# Patient Record
Sex: FEMALE | Race: WHITE | NOT HISPANIC OR LATINO | Employment: UNEMPLOYED | URBAN - NONMETROPOLITAN AREA
[De-identification: names, ages, dates, MRNs, and addresses within clinical notes are randomized per-mention and may not be internally consistent; named-entity substitution may affect disease eponyms.]

---

## 2018-02-19 ENCOUNTER — HISTORICAL (OUTPATIENT)
Dept: ADMINISTRATIVE | Facility: HOSPITAL | Age: 48
End: 2018-02-19

## 2018-03-14 ENCOUNTER — HISTORICAL (OUTPATIENT)
Dept: ADMINISTRATIVE | Facility: HOSPITAL | Age: 48
End: 2018-03-14

## 2020-01-23 ENCOUNTER — HISTORICAL (OUTPATIENT)
Dept: ADMINISTRATIVE | Facility: HOSPITAL | Age: 50
End: 2020-01-23

## 2020-01-23 LAB
ALBUMIN SERPL-MCNC: 3.9 G/DL (ref 3.8–4.9)
ALBUMIN/GLOB SERPL: 1.8 {RATIO} (ref 1.2–2.2)
ALP SERPL-CCNC: 112 IU/L (ref 39–117)
ALT SERPL-CCNC: 20 IU/L (ref 0–32)
AST SERPL-CCNC: 16 IU/L (ref 0–40)
BASOPHILS # BLD AUTO: 0.1 X10E3/UL (ref 0–0.2)
BASOPHILS NFR BLD AUTO: 1 %
BILIRUB SERPL-MCNC: 0.3 MG/DL (ref 0–1.2)
BUN SERPL-MCNC: 11 MG/DL (ref 6–24)
CALCIUM SERPL-MCNC: 8.9 MG/DL (ref 8.7–10.2)
CHLORIDE SERPL-SCNC: 100 MMOL/L (ref 96–106)
CHOLEST SERPL-MCNC: 193 MG/DL (ref 100–199)
CHOLEST/HDLC SERPL: 5.2 RATIO (ref 0–4.4)
CO2 SERPL-SCNC: 20 MMOL/L (ref 20–29)
CREAT SERPL-MCNC: 0.91 MG/DL (ref 0.57–1)
CREAT/UREA NIT SERPL: 12 (ref 9–23)
EOSINOPHIL # BLD AUTO: 0.1 X10E3/UL (ref 0–0.4)
EOSINOPHIL NFR BLD AUTO: 2 %
ERYTHROCYTE [DISTWIDTH] IN BLOOD BY AUTOMATED COUNT: 12.5 % (ref 11.7–15.4)
GLOBULIN SER-MCNC: 2.2 G/DL (ref 1.5–4.5)
GLUCOSE SERPL-MCNC: 121 MG/DL (ref 65–99)
HBA1C MFR BLD: 5.7 % (ref 4.8–5.6)
HCT VFR BLD AUTO: 46 % (ref 34–46.6)
HDLC SERPL-MCNC: 37 MG/DL
HGB BLD-MCNC: 15.7 G/DL (ref 11.1–15.9)
LDLC SERPL CALC-MCNC: 123 MG/DL (ref 0–99)
LYMPHOCYTES # BLD AUTO: 1.9 X10E3/UL (ref 0.7–3.1)
LYMPHOCYTES NFR BLD AUTO: 25 %
MCH RBC QN AUTO: 29.7 PG (ref 26.6–33)
MCHC RBC AUTO-ENTMCNC: 34.1 G/DL (ref 31.5–35.7)
MCV RBC AUTO: 87 FL (ref 79–97)
MONOCYTES # BLD AUTO: 0.7 X10E3/UL (ref 0.1–0.9)
MONOCYTES NFR BLD AUTO: 9 %
NEUTROPHILS # BLD AUTO: 4.7 X10E3/UL (ref 1.4–7)
NEUTROPHILS NFR BLD AUTO: 63 %
PLATELET # BLD AUTO: 207 X10E3/UL (ref 150–450)
POTASSIUM SERPL-SCNC: 4.1 MMOL/L (ref 3.5–5.2)
PROT SERPL-MCNC: 6.1 G/DL (ref 6–8.5)
RBC # BLD AUTO: 5.28 X10(6)/MCL (ref 3.77–5.28)
SODIUM SERPL-SCNC: 136 MMOL/L (ref 134–144)
TRIGL SERPL-MCNC: 167 MG/DL (ref 0–149)
TSH SERPL-ACNC: 1.9 MIU/ML (ref 0.45–4.5)
VLDLC SERPL CALC-MCNC: 33 MG/DL (ref 5–40)
WBC # SPEC AUTO: 7.6 X10E3/UL (ref 3.4–10.8)

## 2020-04-27 ENCOUNTER — HISTORICAL (OUTPATIENT)
Dept: ADMINISTRATIVE | Facility: HOSPITAL | Age: 50
End: 2020-04-27

## 2020-04-27 LAB
ALBUMIN SERPL-MCNC: 4.4 G/DL (ref 3.8–4.9)
ALBUMIN/GLOB SERPL: 2.4 {RATIO} (ref 1.2–2.2)
ALP SERPL-CCNC: 97 IU/L (ref 39–117)
ALT SERPL-CCNC: 27 IU/L (ref 0–32)
AST SERPL-CCNC: 24 IU/L (ref 0–40)
BILIRUB SERPL-MCNC: 0.2 MG/DL (ref 0–1.2)
BUN SERPL-MCNC: 10 MG/DL (ref 6–24)
CALCIUM SERPL-MCNC: 9.2 MG/DL (ref 8.7–10.2)
CHLORIDE SERPL-SCNC: 100 MMOL/L (ref 96–106)
CHOLEST SERPL-MCNC: 138 MG/DL (ref 100–199)
CHOLEST/HDLC SERPL: 3.3 RATIO (ref 0–4.4)
CO2 SERPL-SCNC: 23 MMOL/L (ref 20–29)
CREAT SERPL-MCNC: 0.99 MG/DL (ref 0.57–1)
CREAT/UREA NIT SERPL: 10 (ref 9–23)
GLOBULIN SER-MCNC: 1.8 G/DL (ref 1.5–4.5)
GLUCOSE SERPL-MCNC: 103 MG/DL (ref 65–99)
HDLC SERPL-MCNC: 42 MG/DL
LDLC SERPL CALC-MCNC: 73 MG/DL (ref 0–99)
POTASSIUM SERPL-SCNC: 4.5 MMOL/L (ref 3.5–5.2)
PROT SERPL-MCNC: 6.2 G/DL (ref 6–8.5)
SODIUM SERPL-SCNC: 138 MMOL/L (ref 134–144)
TRIGL SERPL-MCNC: 113 MG/DL (ref 0–149)
VLDLC SERPL CALC-MCNC: 23 MG/DL (ref 5–40)

## 2021-04-05 ENCOUNTER — HISTORICAL (OUTPATIENT)
Dept: ADMINISTRATIVE | Facility: HOSPITAL | Age: 51
End: 2021-04-05

## 2021-04-05 LAB
ALBUMIN SERPL-MCNC: 4.2 G/DL (ref 3.8–4.9)
ALBUMIN/GLOB SERPL: 2.2 {RATIO} (ref 1.2–2.2)
ALP SERPL-CCNC: 99 IU/L (ref 39–117)
ALT SERPL-CCNC: 31 IU/L (ref 0–32)
AST SERPL-CCNC: 19 IU/L (ref 0–40)
BASOPHILS # BLD AUTO: 0.1 X10E3/UL (ref 0–0.2)
BASOPHILS NFR BLD AUTO: 1 %
BILIRUB SERPL-MCNC: 0.2 MG/DL (ref 0–1.2)
BUN SERPL-MCNC: 12 MG/DL (ref 6–24)
CALCIUM SERPL-MCNC: 8.9 MG/DL (ref 8.7–10.2)
CHLORIDE SERPL-SCNC: 103 MMOL/L (ref 96–106)
CHOLEST SERPL-MCNC: 223 MG/DL (ref 100–199)
CHOLEST/HDLC SERPL: 6 RATIO (ref 0–4.4)
CO2 SERPL-SCNC: 24 MMOL/L (ref 20–29)
CREAT SERPL-MCNC: 1.03 MG/DL (ref 0.57–1)
CREAT/UREA NIT SERPL: 12 (ref 9–23)
DEPRECATED CALCIDIOL+CALCIFEROL SERPL-MC: 34.1 NG/ML (ref 30–100)
EOSINOPHIL # BLD AUTO: 0.2 X10E3/UL (ref 0–0.4)
EOSINOPHIL NFR BLD AUTO: 2 %
ERYTHROCYTE [DISTWIDTH] IN BLOOD BY AUTOMATED COUNT: 12.8 % (ref 11.7–15.4)
GLOBULIN SER-MCNC: 1.9 G/DL (ref 1.5–4.5)
GLUCOSE SERPL-MCNC: 106 MG/DL (ref 65–99)
HBA1C MFR BLD: 5.7 % (ref 4.8–5.6)
HCT VFR BLD AUTO: 47.6 % (ref 34–46.6)
HDLC SERPL-MCNC: 37 MG/DL
HGB BLD-MCNC: 16 G/DL (ref 11.1–15.9)
LDLC SERPL CALC-MCNC: 139 MG/DL (ref 0–99)
LYMPHOCYTES # BLD AUTO: 2.3 X10E3/UL (ref 0.7–3.1)
LYMPHOCYTES NFR BLD AUTO: 33 %
MCH RBC QN AUTO: 29.9 PG (ref 26.6–33)
MCHC RBC AUTO-ENTMCNC: 33.6 G/DL (ref 31.5–35.7)
MCV RBC AUTO: 89 FL (ref 79–97)
MONOCYTES # BLD AUTO: 0.6 X10E3/UL (ref 0.1–0.9)
MONOCYTES NFR BLD AUTO: 9 %
NEUTROPHILS # BLD AUTO: 3.8 X10E3/UL (ref 1.4–7)
NEUTROPHILS NFR BLD AUTO: 55 %
PLATELET # BLD AUTO: 250 X10E3/UL (ref 150–450)
POTASSIUM SERPL-SCNC: 4.4 MMOL/L (ref 3.5–5.2)
PROT SERPL-MCNC: 6.1 G/DL (ref 6–8.5)
RBC # BLD AUTO: 5.36 X10(6)/MCL (ref 3.77–5.28)
SODIUM SERPL-SCNC: 138 MMOL/L (ref 134–144)
TRIGL SERPL-MCNC: 258 MG/DL (ref 0–149)
TSH SERPL-ACNC: 2.5 MIU/ML (ref 0.45–4.5)
VLDLC SERPL CALC-MCNC: 47 MG/DL (ref 5–40)
WBC # SPEC AUTO: 7 X10E3/UL (ref 3.4–10.8)

## 2022-01-03 ENCOUNTER — HISTORICAL (OUTPATIENT)
Dept: ADMINISTRATIVE | Facility: HOSPITAL | Age: 52
End: 2022-01-03

## 2022-01-03 LAB
ALBUMIN SERPL-MCNC: 4.4 G/DL (ref 3.8–4.9)
ALBUMIN/GLOB SERPL: 2.8 {RATIO} (ref 1.2–2.2)
ALP SERPL-CCNC: 106 IU/L (ref 44–121)
ALT SERPL-CCNC: 19 IU/L (ref 0–32)
AST SERPL-CCNC: 15 IU/L (ref 0–40)
BASOPHILS # BLD AUTO: 0.1 X10E3/UL (ref 0–0.2)
BASOPHILS NFR BLD AUTO: 2 %
BILIRUB SERPL-MCNC: 0.3 MG/DL (ref 0–1.2)
BUN SERPL-MCNC: 12 MG/DL (ref 6–24)
CALCIUM SERPL-MCNC: 8.7 MG/DL (ref 8.7–10.2)
CHLORIDE SERPL-SCNC: 103 MMOL/L (ref 96–106)
CHOLEST SERPL-MCNC: 167 MG/DL (ref 100–199)
CHOLEST/HDLC SERPL: 3.8 RATIO (ref 0–4.4)
CO2 SERPL-SCNC: 21 MMOL/L (ref 20–29)
CREAT SERPL-MCNC: 0.98 MG/DL (ref 0.57–1)
CREAT/UREA NIT SERPL: 12 (ref 9–23)
DEPRECATED CALCIDIOL+CALCIFEROL SERPL-MC: 35.3 NG/ML (ref 30–100)
EOSINOPHIL # BLD AUTO: 0.2 X10E3/UL (ref 0–0.4)
EOSINOPHIL NFR BLD AUTO: 2 %
ERYTHROCYTE [DISTWIDTH] IN BLOOD BY AUTOMATED COUNT: 13.1 % (ref 11.7–15.4)
GLOBULIN SER-MCNC: 1.6 G/DL (ref 1.5–4.5)
GLUCOSE SERPL-MCNC: 120 MG/DL (ref 65–99)
HBA1C MFR BLD: 6.2 % (ref 4.8–5.6)
HCT VFR BLD AUTO: 47.7 % (ref 34–46.6)
HDLC SERPL-MCNC: 44 MG/DL
HGB BLD-MCNC: 16.5 G/DL (ref 11.1–15.9)
LDLC SERPL CALC-MCNC: 101 MG/DL (ref 0–99)
LYMPHOCYTES # BLD AUTO: 2 X10E3/UL (ref 0.7–3.1)
LYMPHOCYTES NFR BLD AUTO: 30 %
MCH RBC QN AUTO: 29.7 PG (ref 26.6–33)
MCHC RBC AUTO-ENTMCNC: 34.6 G/DL (ref 31.5–35.7)
MCV RBC AUTO: 86 FL (ref 79–97)
MONOCYTES # BLD AUTO: 0.5 X10E3/UL (ref 0.1–0.9)
MONOCYTES NFR BLD AUTO: 8 %
NEUTROPHILS # BLD AUTO: 3.8 X10E3/UL (ref 1.4–7)
NEUTROPHILS NFR BLD AUTO: 58 %
PLATELET # BLD AUTO: 262 X10E3/UL (ref 150–450)
POTASSIUM SERPL-SCNC: 4.5 MMOL/L (ref 3.5–5.2)
PROT SERPL-MCNC: 6 G/DL (ref 6–8.5)
RBC # BLD AUTO: 5.55 X10(6)/MCL (ref 3.77–5.28)
SODIUM SERPL-SCNC: 141 MMOL/L (ref 134–144)
TRIGL SERPL-MCNC: 124 MG/DL (ref 0–149)
TSH SERPL-ACNC: 2.72 MIU/ML (ref 0.45–4.5)
VLDLC SERPL CALC-MCNC: 22 MG/DL (ref 5–40)
WBC # SPEC AUTO: 6.6 X10E3/UL (ref 3.4–10.8)

## 2022-01-31 LAB — NONINV COLON CA DNA+OCC BLD SCRN STL QL: NEGATIVE

## 2022-04-11 ENCOUNTER — HISTORICAL (OUTPATIENT)
Dept: ADMINISTRATIVE | Facility: HOSPITAL | Age: 52
End: 2022-04-11

## 2022-04-26 VITALS
WEIGHT: 189 LBS | BODY MASS INDEX: 32.27 KG/M2 | OXYGEN SATURATION: 97 % | SYSTOLIC BLOOD PRESSURE: 128 MMHG | DIASTOLIC BLOOD PRESSURE: 72 MMHG | HEIGHT: 64 IN

## 2022-06-03 ENCOUNTER — HISTORICAL (OUTPATIENT)
Dept: ADMINISTRATIVE | Facility: HOSPITAL | Age: 52
End: 2022-06-03

## 2023-02-02 ENCOUNTER — DOCUMENTATION ONLY (OUTPATIENT)
Dept: ADMINISTRATIVE | Facility: HOSPITAL | Age: 53
End: 2023-02-02
Payer: COMMERCIAL

## 2023-06-06 ENCOUNTER — OFFICE VISIT (OUTPATIENT)
Dept: FAMILY MEDICINE | Facility: CLINIC | Age: 53
End: 2023-06-06
Payer: COMMERCIAL

## 2023-06-06 VITALS
BODY MASS INDEX: 31.65 KG/M2 | WEIGHT: 190 LBS | HEIGHT: 65 IN | DIASTOLIC BLOOD PRESSURE: 64 MMHG | TEMPERATURE: 98 F | OXYGEN SATURATION: 96 % | HEART RATE: 63 BPM | RESPIRATION RATE: 20 BRPM | SYSTOLIC BLOOD PRESSURE: 122 MMHG

## 2023-06-06 DIAGNOSIS — J32.9 SINUSITIS, UNSPECIFIED CHRONICITY, UNSPECIFIED LOCATION: Primary | ICD-10-CM

## 2023-06-06 DIAGNOSIS — E78.5 HYPERLIPIDEMIA, UNSPECIFIED HYPERLIPIDEMIA TYPE: ICD-10-CM

## 2023-06-06 DIAGNOSIS — J44.89 EXACERBATION OF CHRONIC BRONCHIOLITIS: ICD-10-CM

## 2023-06-06 DIAGNOSIS — Z71.6 ENCOUNTER FOR SMOKING CESSATION COUNSELING: ICD-10-CM

## 2023-06-06 DIAGNOSIS — Z11.59 NEED FOR HEPATITIS C SCREENING TEST: ICD-10-CM

## 2023-06-06 DIAGNOSIS — Z72.0 CURRENT NICOTINE USE: ICD-10-CM

## 2023-06-06 DIAGNOSIS — Z11.4 SCREENING FOR HIV (HUMAN IMMUNODEFICIENCY VIRUS): ICD-10-CM

## 2023-06-06 DIAGNOSIS — J32.1 CHRONIC FRONTAL SINUSITIS: ICD-10-CM

## 2023-06-06 PROBLEM — E55.9 VITAMIN D DEFICIENCY: Status: ACTIVE | Noted: 2023-06-06

## 2023-06-06 PROBLEM — D75.1 ERYTHROCYTOSIS: Status: ACTIVE | Noted: 2023-06-06

## 2023-06-06 PROBLEM — J30.2 SEASONAL ALLERGIC RHINITIS: Status: ACTIVE | Noted: 2023-06-06

## 2023-06-06 PROBLEM — E78.2 MIXED HYPERLIPIDEMIA: Status: ACTIVE | Noted: 2023-06-06

## 2023-06-06 PROBLEM — J40 CHRONIC SINUSITIS WITH RECURRENT BRONCHITIS: Status: ACTIVE | Noted: 2023-06-06

## 2023-06-06 PROBLEM — R73.02 IMPAIRED GLUCOSE TOLERANCE: Status: ACTIVE | Noted: 2023-06-06

## 2023-06-06 PROBLEM — I10 PRIMARY HYPERTENSION: Status: ACTIVE | Noted: 2023-06-06

## 2023-06-06 PROCEDURE — 99214 OFFICE O/P EST MOD 30 MIN: CPT | Mod: ,,, | Performed by: NURSE PRACTITIONER

## 2023-06-06 PROCEDURE — 99214 PR OFFICE/OUTPT VISIT, EST, LEVL IV, 30-39 MIN: ICD-10-PCS | Mod: ,,, | Performed by: NURSE PRACTITIONER

## 2023-06-06 RX ORDER — IBUPROFEN 200 MG
1 TABLET ORAL DAILY
Qty: 14 PATCH | Refills: 0 | Status: SHIPPED | OUTPATIENT
Start: 2023-06-06 | End: 2023-07-11 | Stop reason: ALTCHOICE

## 2023-06-06 RX ORDER — ATORVASTATIN CALCIUM 80 MG/1
80 TABLET, FILM COATED ORAL DAILY
Qty: 90 TABLET | Refills: 1 | Status: SHIPPED | OUTPATIENT
Start: 2023-06-06 | End: 2024-02-15

## 2023-06-06 RX ORDER — VARENICLINE TARTRATE 0.5 (11)-1
KIT ORAL
Qty: 1 EACH | Refills: 0 | Status: SHIPPED | OUTPATIENT
Start: 2023-06-06 | End: 2023-07-11 | Stop reason: ALTCHOICE

## 2023-06-06 RX ORDER — MONTELUKAST SODIUM 10 MG/1
10 TABLET ORAL NIGHTLY
Qty: 90 TABLET | Refills: 1 | Status: SHIPPED | OUTPATIENT
Start: 2023-06-06 | End: 2023-07-11 | Stop reason: SDUPTHER

## 2023-06-06 RX ORDER — METOPROLOL SUCCINATE 25 MG/1
1 TABLET, EXTENDED RELEASE ORAL DAILY
COMMUNITY
Start: 2022-11-29 | End: 2024-02-15

## 2023-06-06 RX ORDER — LEVOFLOXACIN 500 MG/1
500 TABLET, FILM COATED ORAL DAILY
Qty: 21 TABLET | Refills: 0 | Status: SHIPPED | OUTPATIENT
Start: 2023-06-06 | End: 2023-07-11 | Stop reason: ALTCHOICE

## 2023-06-06 RX ORDER — ATORVASTATIN CALCIUM 80 MG/1
80 TABLET, FILM COATED ORAL DAILY
COMMUNITY
Start: 2022-01-05 | End: 2023-06-06 | Stop reason: SDUPTHER

## 2023-06-06 RX ORDER — MONTELUKAST SODIUM 10 MG/1
10 TABLET ORAL NIGHTLY
COMMUNITY
Start: 2023-02-15 | End: 2023-06-06 | Stop reason: SDUPTHER

## 2023-06-06 RX ORDER — FLUTICASONE FUROATE, UMECLIDINIUM BROMIDE AND VILANTEROL TRIFENATATE 200; 62.5; 25 UG/1; UG/1; UG/1
1 POWDER RESPIRATORY (INHALATION) DAILY
Qty: 30 EACH | Refills: 2 | Status: SHIPPED | OUTPATIENT
Start: 2023-06-06 | End: 2023-07-11 | Stop reason: SDUPTHER

## 2023-06-06 RX ORDER — CITALOPRAM 40 MG/1
40 TABLET, FILM COATED ORAL DAILY
COMMUNITY
Start: 2023-02-15 | End: 2023-07-11 | Stop reason: SDUPTHER

## 2023-06-06 NOTE — ASSESSMENT & PLAN NOTE
Continue zytec daily, add guaifenesin extra water. Nasal irrigation three times daily. Levoquin 500 daily for 3 weeks for chronic recurrent sinusitis. Refer to ENT for evaluation and treatment.

## 2023-06-06 NOTE — PROGRESS NOTES
Subjective:       Patient ID: Mary Salguero is a 53 y.o. female.    Chief Complaint: Cough and Nasal Congestion (Started in Feb. Was seen in Urgent Care. Given steroid shot and doxy and tessalon pearles. No improvement after steroid shot wore off. )    Patient retired complaint of sinus pain and pressure that had that affecting primarily the left entire side of her bed starting at the frontal ethmoid and maxillary sinus areas.  It started approximately a month ago with more painful scarring on antibiotics with doxycycline from urgent care but it never really improved.  Off place she feels the symptoms began in February and had been ongoing for 3 months now without any clear.  She is also coughing more and noticing it now with purulence and has cough has become continuous.  She is still smoking between 1/2 to 2 pack daily and is almost ready to consider smoking cessation. Tried chantix in past to stop smoking successfully.     Sinus Problem  This is a recurrent problem. The current episode started more than 1 month ago. The problem has been waxing and waning since onset. There has been no fever. The pain is moderate. Associated symptoms include coughing, ear pain, sinus pressure and swollen glands. Pertinent negatives include no chills, congestion, diaphoresis, headaches, neck pain, shortness of breath or sore throat. Past treatments include sitting up. The treatment provided mild relief.   Review of Systems   Constitutional:  Negative for activity change, appetite change, chills and diaphoresis.   HENT:  Positive for ear pain, postnasal drip and sinus pressure/congestion. Negative for nasal congestion, sore throat, trouble swallowing and voice change.    Eyes: Negative.  Negative for visual disturbance.   Respiratory:  Positive for cough. Negative for chest tightness, shortness of breath and wheezing.    Cardiovascular:  Negative for chest pain, palpitations and leg swelling.   Endocrine: Negative for cold  "intolerance, heat intolerance and polyuria.   Genitourinary:  Negative for bladder incontinence, difficulty urinating, flank pain and frequency.   Musculoskeletal:  Negative for neck pain.   Allergic/Immunologic: Positive for environmental allergies. Negative for food allergies.   Neurological:  Negative for vertigo, tremors, seizures, syncope, light-headedness, headaches, coordination difficulties and coordination difficulties.   Hematological:  Negative for adenopathy. Does not bruise/bleed easily.   Psychiatric/Behavioral:  Negative for agitation, sleep disturbance and suicidal ideas.        Objective:      Vitals:    06/06/23 1435   BP: 122/64   Pulse: 63   Resp: 20   Temp: 97.8 °F (36.6 °C)   SpO2: 96%   Weight: 86.2 kg (190 lb)   Height: 5' 4.5" (1.638 m)       Physical Exam  Nursing note reviewed.   Constitutional:       Appearance: Normal appearance.   HENT:      Head: Normocephalic.      Right Ear: Tympanic membrane normal.      Left Ear: A middle ear effusion is present. Tympanic membrane is not erythematous.      Nose: Nasal tenderness, congestion and rhinorrhea present.      Right Nostril: Foreign body and epistaxis present.      Left Nostril: Foreign body and epistaxis present.      Right Turbinates: Enlarged and swollen.      Left Turbinates: Enlarged and swollen.      Left Sinus: Maxillary sinus tenderness and frontal sinus tenderness present.      Mouth/Throat:      Mouth: Mucous membranes are dry.      Tongue: No lesions.      Palate: No mass.      Pharynx: Posterior oropharyngeal erythema present. No pharyngeal swelling or oropharyngeal exudate.   Eyes:      Extraocular Movements: Extraocular movements intact.      Conjunctiva/sclera: Conjunctivae normal.   Cardiovascular:      Rate and Rhythm: Normal rate and regular rhythm.      Pulses: Normal pulses.   Pulmonary:      Effort: Pulmonary effort is normal.      Breath sounds: No decreased air movement. Examination of the right-middle field reveals " wheezing and rhonchi. Examination of the left-middle field reveals wheezing and rhonchi. Examination of the right-lower field reveals wheezing and rhonchi. Examination of the left-lower field reveals wheezing and rhonchi. Wheezing and rhonchi present. No decreased breath sounds or rales.   Abdominal:      General: Bowel sounds are normal.      Palpations: Abdomen is soft.   Musculoskeletal:         General: Normal range of motion.      Cervical back: Normal range of motion.   Skin:     General: Skin is warm and dry.      Capillary Refill: Capillary refill takes less than 2 seconds.   Neurological:      General: No focal deficit present.      Mental Status: She is alert.   Psychiatric:         Mood and Affect: Mood normal.       Assessment/Plan:     1. Sinusitis, unspecified chronicity, unspecified location  Assessment & Plan:  Continue zytec daily, add guaifenesin extra water. Nasal irrigation three times daily. Levoquin 500 daily for 3 weeks for chronic recurrent sinusitis. Refer to ENT for evaluation and treatment.       2. Screening for HIV (human immunodeficiency virus)    3. Need for hepatitis C screening test    4. Encounter for smoking cessation counseling  Assessment & Plan:  It is very important that she quit smoking. There are various alternatives available to help with this difficult task, but first and foremost, she must make a firm commitment and decision to quit. The nature of nicotine addiction is discussed. The usefulness of behavioral therapy is discussed and suggested.  The correct use, cost and side effects of nicotine replacement therapy such as gum or patches is discussed. Chantix and its cost (sometimes not covered fully by insurance) and side effects are reviewed. The quit rates are discussed. I recommend she not allow potential costs of treatment to deter her from using nicotine replacement therapy or bupropion, as the long term economic and health benefits are obvious. Use nicotine 21 mg for  2 weeks 14 mg for 2 weeks, 7 mg for 2 weeks then stop but continue chantix for 3 months. Counseling for 20 minutes        5. Current nicotine use  Assessment & Plan:  Smoking 1/2 to two packs daily since 12, willing to begin chantix and nicotine patches.       6. Exacerbation of chronic bronchiolitis    7. Hyperlipidemia, unspecified hyperlipidemia type       No follow-ups on file.          Discussed the diagnosis, prognosis, plan of care, chronic nature of and indications for, risks and benefits of treatment for conditions.  Continue all medications as prescribed unless otherwise noted.   Call if patient develops other symptoms or if not better in 2-3 days and sooner if worse. Emphasized the importance of compliance with all recommendations, including medication use and timely follow up. Instructed to return as noted be or sooner if patient develops any other problems or if there are any other additional questions or concerns.

## 2023-06-06 NOTE — ASSESSMENT & PLAN NOTE
It is very important that she quit smoking. There are various alternatives available to help with this difficult task, but first and foremost, she must make a firm commitment and decision to quit. The nature of nicotine addiction is discussed. The usefulness of behavioral therapy is discussed and suggested.  The correct use, cost and side effects of nicotine replacement therapy such as gum or patches is discussed. Chantix and its cost (sometimes not covered fully by insurance) and side effects are reviewed. The quit rates are discussed. I recommend she not allow potential costs of treatment to deter her from using nicotine replacement therapy or bupropion, as the long term economic and health benefits are obvious. Use nicotine 21 mg for 2 weeks 14 mg for 2 weeks, 7 mg for 2 weeks then stop but continue chantix for 3 months. Counseling for 20 minutes

## 2023-06-12 ENCOUNTER — HOSPITAL ENCOUNTER (OUTPATIENT)
Dept: RADIOLOGY | Facility: HOSPITAL | Age: 53
Discharge: HOME | End: 2023-06-12
Attending: NURSE PRACTITIONER
Payer: COMMERCIAL

## 2023-06-12 DIAGNOSIS — J32.9 SINUSITIS, UNSPECIFIED CHRONICITY, UNSPECIFIED LOCATION: ICD-10-CM

## 2023-06-12 PROCEDURE — 71046 X-RAY EXAM CHEST 2 VIEWS: CPT | Mod: TC

## 2023-06-12 PROCEDURE — 70220 X-RAY EXAM OF SINUSES: CPT | Mod: TC

## 2023-06-12 PROCEDURE — 70486 CT MAXILLOFACIAL W/O DYE: CPT | Mod: TC

## 2023-06-13 ENCOUNTER — TELEPHONE (OUTPATIENT)
Dept: FAMILY MEDICINE | Facility: CLINIC | Age: 53
End: 2023-06-13
Payer: COMMERCIAL

## 2023-06-13 NOTE — TELEPHONE ENCOUNTER
----- Message from Criselda Garcia DNP sent at 6/12/2023  6:25 PM CDT -----  Notify patient she has a fairly large, 3 cm benign mucous retention cyst in right maxillary sinus, and recommend ENT referral for further evaluation and treatment.  Continue treatment as discussed. Notify any changes. Please check the ENT referral status. How is she doing with levoquin?

## 2023-06-13 NOTE — TELEPHONE ENCOUNTER
----- Message from Criselda Garcia DNP sent at 6/12/2023  6:26 PM CDT -----  Notify chronic lung changes with bronchitis. No pneumonia, but continue to use inhaler with trelegy and check progress for quitting smoking. Continue treatment as discussed. Notify any changes.

## 2023-07-11 ENCOUNTER — OFFICE VISIT (OUTPATIENT)
Dept: FAMILY MEDICINE | Facility: CLINIC | Age: 53
End: 2023-07-11
Payer: COMMERCIAL

## 2023-07-11 VITALS
TEMPERATURE: 97 F | HEART RATE: 56 BPM | RESPIRATION RATE: 20 BRPM | DIASTOLIC BLOOD PRESSURE: 72 MMHG | OXYGEN SATURATION: 96 % | WEIGHT: 186 LBS | SYSTOLIC BLOOD PRESSURE: 121 MMHG | BODY MASS INDEX: 30.99 KG/M2 | HEIGHT: 65 IN

## 2023-07-11 DIAGNOSIS — F34.1 PERSISTENT DEPRESSIVE DISORDER: ICD-10-CM

## 2023-07-11 DIAGNOSIS — D75.1 ERYTHROCYTOSIS: ICD-10-CM

## 2023-07-11 DIAGNOSIS — J30.2 SEASONAL ALLERGIC RHINITIS, UNSPECIFIED TRIGGER: ICD-10-CM

## 2023-07-11 DIAGNOSIS — Z11.59 NEED FOR HEPATITIS C SCREENING TEST: ICD-10-CM

## 2023-07-11 DIAGNOSIS — G47.33 OSA (OBSTRUCTIVE SLEEP APNEA): ICD-10-CM

## 2023-07-11 DIAGNOSIS — Z72.0 CURRENT NICOTINE USE: ICD-10-CM

## 2023-07-11 DIAGNOSIS — Z11.4 SCREENING FOR HIV (HUMAN IMMUNODEFICIENCY VIRUS): ICD-10-CM

## 2023-07-11 DIAGNOSIS — Z12.31 SCREENING MAMMOGRAM FOR BREAST CANCER: ICD-10-CM

## 2023-07-11 DIAGNOSIS — I10 PRIMARY HYPERTENSION: ICD-10-CM

## 2023-07-11 DIAGNOSIS — Z71.6 ENCOUNTER FOR SMOKING CESSATION COUNSELING: ICD-10-CM

## 2023-07-11 DIAGNOSIS — E55.9 VITAMIN D DEFICIENCY: ICD-10-CM

## 2023-07-11 DIAGNOSIS — Z12.11 SCREENING FOR COLON CANCER: ICD-10-CM

## 2023-07-11 DIAGNOSIS — E78.2 MIXED HYPERLIPIDEMIA: Primary | ICD-10-CM

## 2023-07-11 DIAGNOSIS — G47.19 EXCESSIVE DAYTIME SLEEPINESS: ICD-10-CM

## 2023-07-11 DIAGNOSIS — J32.9 SINUSITIS, UNSPECIFIED CHRONICITY, UNSPECIFIED LOCATION: ICD-10-CM

## 2023-07-11 DIAGNOSIS — R73.02 IMPAIRED GLUCOSE TOLERANCE: ICD-10-CM

## 2023-07-11 DIAGNOSIS — Z79.899 LONG TERM USE OF DRUG: ICD-10-CM

## 2023-07-11 DIAGNOSIS — E61.1 IRON DEFICIENCY: ICD-10-CM

## 2023-07-11 PROCEDURE — 3008F PR BODY MASS INDEX (BMI) DOCUMENTED: ICD-10-PCS | Mod: CPTII,,, | Performed by: NURSE PRACTITIONER

## 2023-07-11 PROCEDURE — 3078F PR MOST RECENT DIASTOLIC BLOOD PRESSURE < 80 MM HG: ICD-10-PCS | Mod: CPTII,,, | Performed by: NURSE PRACTITIONER

## 2023-07-11 PROCEDURE — 1160F PR REVIEW ALL MEDS BY PRESCRIBER/CLIN PHARMACIST DOCUMENTED: ICD-10-PCS | Mod: CPTII,,, | Performed by: NURSE PRACTITIONER

## 2023-07-11 PROCEDURE — 99214 OFFICE O/P EST MOD 30 MIN: CPT | Mod: ,,, | Performed by: NURSE PRACTITIONER

## 2023-07-11 PROCEDURE — 1159F PR MEDICATION LIST DOCUMENTED IN MEDICAL RECORD: ICD-10-PCS | Mod: CPTII,,, | Performed by: NURSE PRACTITIONER

## 2023-07-11 PROCEDURE — 3074F SYST BP LT 130 MM HG: CPT | Mod: CPTII,,, | Performed by: NURSE PRACTITIONER

## 2023-07-11 PROCEDURE — 99214 PR OFFICE/OUTPT VISIT, EST, LEVL IV, 30-39 MIN: ICD-10-PCS | Mod: ,,, | Performed by: NURSE PRACTITIONER

## 2023-07-11 PROCEDURE — 3074F PR MOST RECENT SYSTOLIC BLOOD PRESSURE < 130 MM HG: ICD-10-PCS | Mod: CPTII,,, | Performed by: NURSE PRACTITIONER

## 2023-07-11 PROCEDURE — 3078F DIAST BP <80 MM HG: CPT | Mod: CPTII,,, | Performed by: NURSE PRACTITIONER

## 2023-07-11 PROCEDURE — 3008F BODY MASS INDEX DOCD: CPT | Mod: CPTII,,, | Performed by: NURSE PRACTITIONER

## 2023-07-11 PROCEDURE — 1160F RVW MEDS BY RX/DR IN RCRD: CPT | Mod: CPTII,,, | Performed by: NURSE PRACTITIONER

## 2023-07-11 PROCEDURE — 1159F MED LIST DOCD IN RCRD: CPT | Mod: CPTII,,, | Performed by: NURSE PRACTITIONER

## 2023-07-11 RX ORDER — MONTELUKAST SODIUM 10 MG/1
10 TABLET ORAL NIGHTLY
Qty: 90 TABLET | Refills: 1 | Status: SHIPPED | OUTPATIENT
Start: 2023-07-11 | End: 2023-12-20 | Stop reason: SDUPTHER

## 2023-07-11 RX ORDER — CITALOPRAM 40 MG/1
40 TABLET, FILM COATED ORAL DAILY
Qty: 90 TABLET | Refills: 1 | Status: SHIPPED | OUTPATIENT
Start: 2023-07-11 | End: 2024-02-09 | Stop reason: SDUPTHER

## 2023-07-11 NOTE — PROGRESS NOTES
"Subjective:       Patient ID: Mary Salguero is a 53 y.o. female.    Chief Complaint: Follow-up (On sinus congestion. States that she feels the same. Seen ENT in [place]. Was told everything looked good and told to use nasal rinse bid and flonase. Has f/u in oct. Also due for mammo and breast exam and needing fasting lab.)    The patient returns for fasting lab and follow-up with sinus that continues to be blocked on the left side but much improved from last visit.  Has seen ENT and was told was clear at that time.  Does have Flonase with simply saline at home and plans to use it once a day tried the Chantix and quit smoking program started but then decided not ready to quit yet.  Still smoking 1 pack a day.  Also history of elevated red blood cells we will check iron and notify.  Also very tired on a regular basis in open to the idea of home sleep study.    Review of Systems   Constitutional:  Positive for fatigue. Negative for activity change, appetite change, chills and fever.   HENT:  Positive for rhinorrhea and sinus pressure/congestion.    Gastrointestinal:  Negative for abdominal distention, abdominal pain, change in bowel habit, nausea and change in bowel habit.   Genitourinary:  Negative for difficulty urinating, dyspareunia, dysuria, flank pain, frequency, genital sores, hematuria, pelvic pain and urgency.   Musculoskeletal:  Negative for arthralgias.   Allergic/Immunologic: Negative for environmental allergies, food allergies and immunocompromised state.   Hematological:  Negative for adenopathy.   Psychiatric/Behavioral:  Positive for sleep disturbance. Negative for confusion.        Objective:      Vitals:    07/11/23 0713   BP: 121/72   Pulse: (!) 56   Resp: 20   Temp: 97.3 °F (36.3 °C)   SpO2: 96%   Weight: 84.4 kg (186 lb)   Height: 5' 4.5" (1.638 m)       Physical Exam  Constitutional:       Appearance: Normal appearance.   HENT:      Head: Normocephalic.      Comments: Left TM full but clear " right TM normal left mucosa turbinates edematous and boggy no PND injection mucous membranes dry but pink     Nose: Nose normal.      Mouth/Throat:      Mouth: Mucous membranes are dry.   Eyes:      Extraocular Movements: Extraocular movements intact.      Conjunctiva/sclera: Conjunctivae normal.   Cardiovascular:      Rate and Rhythm: Normal rate and regular rhythm.      Pulses: Normal pulses.   Pulmonary:      Effort: Pulmonary effort is normal.      Breath sounds: Normal breath sounds.   Abdominal:      General: Bowel sounds are normal.      Palpations: Abdomen is soft.   Musculoskeletal:         General: Normal range of motion.      Cervical back: Normal range of motion.   Skin:     General: Skin is warm and dry.      Capillary Refill: Capillary refill takes less than 2 seconds.   Neurological:      General: No focal deficit present.      Mental Status: She is alert.   Psychiatric:         Mood and Affect: Mood normal.       Assessment/Plan:     1. Mixed hyperlipidemia  Assessment & Plan:  Will notify with results of lab draw when available. Continue current treatment until results available.         2. Current nicotine use  Assessment & Plan:  Still smoking 1 pack. Not ready to quit smoking. It is very important that she quit smoking. There are various alternatives available to help with this difficult task, but first and foremost, she must make a firm commitment and decision to quit. The nature of nicotine addiction is discussed. The usefulness of behavioral therapy is discussed and suggested.  The correct use, cost and side effects of nicotine replacement therapy such as gum or patches is discussed. Bupropion and its cost (sometimes not covered fully by insurance) and side effects are reviewed. The quit rates are discussed. I recommend she not allow potential costs of treatment to deter her from using nicotine replacement therapy or bupropion, as the long term economic and health benefits are  obvious.          3. Vitamin D deficiency  Assessment & Plan:  Will notify with results of lab draw when available. Continue current treatment until results available.         4. Primary hypertension  Assessment & Plan:  The current medical regimen is effective;  continue present plan and medications.      5. Encounter for smoking cessation counseling  Assessment & Plan:  Started taking chantix after last visit. Not currently ready to begin cessation at this time.       6. Impaired glucose tolerance  Assessment & Plan:  Will notify with results of lab draw when available. Continue current treatment until results available.         7. Erythrocytosis  Assessment & Plan:  Check CBC with iron to ensure stores are not excessive at this time continues to smoke which could be a prompt but also could be genetic causing the issue or bone marrow that is causing the issue or even liver that is causing the problems.        8. Seasonal allergic rhinitis, unspecified trigger  Assessment & Plan:  Allergies continue has seen the ENT but was clear at the time of visit still experiencing left eustachian tube blockage.  Recommend at least daily nasal rinses with saline followed by Flonase may need to try some Astelin.    Orders:  -     montelukast (SINGULAIR) 10 mg tablet; Take 1 tablet (10 mg total) by mouth every evening.  Dispense: 90 tablet; Refill: 1    9. Sinusitis, unspecified chronicity, unspecified location  -     montelukast (SINGULAIR) 10 mg tablet; Take 1 tablet (10 mg total) by mouth every evening.  Dispense: 90 tablet; Refill: 1    10. Excessive daytime sleepiness  Assessment & Plan:  Patient always feeling tired with irregular sleep patterns we will check for a obstructive sleep apnea with home testing.    Orders:  -     Home Sleep Study; Future    11. Persistent depressive disorder    Other orders  -     citalopram (CELEXA) 40 MG tablet; Take 1 tablet (40 mg total) by mouth once daily.  Dispense: 90 tablet; Refill:  1       Follow up in about 3 months (around 10/11/2023).          Discussed the diagnosis, prognosis, plan of care, chronic nature of and indications for, risks and benefits of treatment for conditions.  Continue all medications as prescribed unless otherwise noted.   Call if patient develops other symptoms or if not better in 2-3 days and sooner if worse. Emphasized the importance of compliance with all recommendations, including medication use and timely follow up. Instructed to return as noted be or sooner if patient develops any other problems or if there are any other additional questions or concerns.

## 2023-07-11 NOTE — ASSESSMENT & PLAN NOTE
Patient always feeling tired with irregular sleep patterns we will check for a obstructive sleep apnea with home testing.

## 2023-07-11 NOTE — ASSESSMENT & PLAN NOTE
Still smoking 1 pack. Not ready to quit smoking. It is very important that she quit smoking. There are various alternatives available to help with this difficult task, but first and foremost, she must make a firm commitment and decision to quit. The nature of nicotine addiction is discussed. The usefulness of behavioral therapy is discussed and suggested.  The correct use, cost and side effects of nicotine replacement therapy such as gum or patches is discussed. Bupropion and its cost (sometimes not covered fully by insurance) and side effects are reviewed. The quit rates are discussed. I recommend she not allow potential costs of treatment to deter her from using nicotine replacement therapy or bupropion, as the long term economic and health benefits are obvious.

## 2023-07-11 NOTE — ASSESSMENT & PLAN NOTE
Check CBC with iron to ensure stores are not excessive at this time continues to smoke which could be a prompt but also could be genetic causing the issue or bone marrow that is causing the issue or even liver that is causing the problems.

## 2023-07-12 LAB
ALBUMIN SERPL-MCNC: 4.1 G/DL (ref 3.8–4.9)
ALBUMIN/GLOB SERPL: 2 {RATIO} (ref 1.2–2.2)
ALP SERPL-CCNC: 99 IU/L (ref 44–121)
ALT SERPL-CCNC: 27 IU/L (ref 0–32)
AST SERPL-CCNC: 22 IU/L (ref 0–40)
BASOPHILS # BLD AUTO: 0.1 X10E3/UL (ref 0–0.2)
BASOPHILS NFR BLD AUTO: 2 %
BILIRUB SERPL-MCNC: 0.4 MG/DL (ref 0–1.2)
BUN SERPL-MCNC: 11 MG/DL (ref 6–24)
BUN/CREAT SERPL: 11 (ref 9–23)
CALCIUM SERPL-MCNC: 9.3 MG/DL (ref 8.7–10.2)
CHLORIDE SERPL-SCNC: 104 MMOL/L (ref 96–106)
CHOLEST SERPL-MCNC: 160 MG/DL (ref 100–199)
CO2 SERPL-SCNC: 21 MMOL/L (ref 20–29)
CREAT SERPL-MCNC: 0.97 MG/DL (ref 0.57–1)
EOSINOPHIL # BLD AUTO: 0.2 X10E3/UL (ref 0–0.4)
EOSINOPHIL NFR BLD AUTO: 3 %
ERYTHROCYTE [DISTWIDTH] IN BLOOD BY AUTOMATED COUNT: 13.1 % (ref 11.7–15.4)
EST. GFR  (NO RACE VARIABLE): 69 ML/MIN/1.73
GLOBULIN SER CALC-MCNC: 2.1 G/DL (ref 1.5–4.5)
GLUCOSE SERPL-MCNC: 98 MG/DL (ref 70–99)
HBA1C MFR BLD: 5.8 % (ref 4.8–5.6)
HCT VFR BLD AUTO: 46.5 % (ref 34–46.6)
HCV IGG SERPL QL IA: NON REACTIVE
HDLC SERPL-MCNC: 38 MG/DL
HGB BLD-MCNC: 16 G/DL (ref 11.1–15.9)
HIV 1+2 AB+HIV1 P24 AG SERPL QL IA: NON REACTIVE
IMM GRANULOCYTES NFR BLD AUTO: 0 %
IRON SERPL-MCNC: 83 UG/DL (ref 27–159)
LDLC SERPL CALC-MCNC: 95 MG/DL (ref 0–99)
LYMPHOCYTES # BLD AUTO: 1.8 X10E3/UL (ref 0.7–3.1)
LYMPHOCYTES NFR BLD AUTO: 30 %
MCH RBC QN AUTO: 30 PG (ref 26.6–33)
MCHC RBC AUTO-ENTMCNC: 34.4 G/DL (ref 31.5–35.7)
MCV RBC AUTO: 87 FL (ref 79–97)
MONOCYTES # BLD AUTO: 0.6 X10E3/UL (ref 0.1–0.9)
MONOCYTES NFR BLD AUTO: 9 %
NEUTROPHILS # BLD AUTO: 3.4 X10E3/UL (ref 1.4–7)
NEUTROPHILS NFR BLD AUTO: 56 %
PLATELET # BLD AUTO: 209 X10E3/UL (ref 150–450)
POTASSIUM SERPL-SCNC: 4.2 MMOL/L (ref 3.5–5.2)
PROT SERPL-MCNC: 6.2 G/DL (ref 6–8.5)
RBC # BLD AUTO: 5.34 X10E6/UL (ref 3.77–5.28)
SODIUM SERPL-SCNC: 140 MMOL/L (ref 134–144)
TRIGL SERPL-MCNC: 151 MG/DL (ref 0–149)
TSH SERPL DL<=0.005 MIU/L-ACNC: 1.57 UIU/ML (ref 0.45–4.5)
VLDLC SERPL CALC-MCNC: 27 MG/DL (ref 5–40)
WBC # BLD AUTO: 6 X10E3/UL (ref 3.4–10.8)

## 2023-07-20 ENCOUNTER — HOSPITAL ENCOUNTER (OUTPATIENT)
Dept: RADIOLOGY | Facility: HOSPITAL | Age: 53
Discharge: HOME | End: 2023-07-20
Attending: NURSE PRACTITIONER
Payer: COMMERCIAL

## 2023-07-20 VITALS — WEIGHT: 186 LBS | BODY MASS INDEX: 31.76 KG/M2 | HEIGHT: 64 IN

## 2023-07-20 DIAGNOSIS — Z12.31 SCREENING MAMMOGRAM FOR BREAST CANCER: ICD-10-CM

## 2023-07-20 PROCEDURE — 77067 SCR MAMMO BI INCL CAD: CPT | Mod: TC

## 2023-07-24 ENCOUNTER — TELEPHONE (OUTPATIENT)
Dept: FAMILY MEDICINE | Facility: CLINIC | Age: 53
End: 2023-07-24
Payer: COMMERCIAL

## 2023-07-24 NOTE — TELEPHONE ENCOUNTER
----- Message from Criselda Garcia DNP sent at 7/24/2023  6:04 AM CDT -----  Normal mammogram, reschedule in one year for breast exam and re screening mammogram unless changes

## 2023-12-20 ENCOUNTER — TELEPHONE (OUTPATIENT)
Dept: FAMILY MEDICINE | Facility: CLINIC | Age: 53
End: 2023-12-20
Payer: COMMERCIAL

## 2023-12-20 DIAGNOSIS — J30.2 SEASONAL ALLERGIC RHINITIS, UNSPECIFIED TRIGGER: ICD-10-CM

## 2023-12-20 DIAGNOSIS — J32.9 SINUSITIS, UNSPECIFIED CHRONICITY, UNSPECIFIED LOCATION: ICD-10-CM

## 2023-12-20 RX ORDER — MONTELUKAST SODIUM 10 MG/1
10 TABLET ORAL NIGHTLY
Qty: 90 TABLET | Refills: 1 | Status: SHIPPED | OUTPATIENT
Start: 2023-12-20 | End: 2024-02-26 | Stop reason: SDUPTHER

## 2023-12-20 NOTE — TELEPHONE ENCOUNTER
----- Message from Kristal Fernandez sent at 12/20/2023  8:45 AM CST -----  Regarding: Med refill  She needs a refill on her Singulair  10mg    [facility]

## 2024-01-18 NOTE — PROGRESS NOTES
"SUBJECTIVE:  Mary Salguero is a 54 y.o. female here alone for inner lip lesion.    HPI  Patient presents with lesion to inside of upper lip for 1 month. Denies pain. No drainage. Patient had thrush when lesion appeared. Ongoing for about one month. She was treated with nystatin for thrush. It is with hardness to area and white area. She denies any pain to area. She does smoke daily for year. She denies any fever, surrounding swelling to area.     Morenas allergies, medications, history, and problem list were updated as appropriate.    Review of Systems   HENT:  Positive for mouth sores.       A comprehensive review of symptoms was completed and negative except as noted above.    OBJECTIVE:  Vital signs  Vitals:    01/22/24 1405   BP: 138/76   BP Location: Right arm   Patient Position: Sitting   Pulse: 77   Resp: 18   Temp: 97.4 °F (36.3 °C)   TempSrc: Temporal   SpO2: 97%   Weight: 80.3 kg (177 lb)   Height: 5' 4" (1.626 m)        Physical Exam  Vitals and nursing note reviewed.   Constitutional:       Appearance: Normal appearance.   HENT:      Head: Normocephalic and atraumatic.      Right Ear: Tympanic membrane, ear canal and external ear normal.      Left Ear: Tympanic membrane, ear canal and external ear normal.      Nose: Nose normal.      Mouth/Throat:      Mouth: Mucous membranes are moist.      Pharynx: Oropharynx is clear.      Comments: 0.5cmx0.5cm nodular lesion with no tenderness, surrounding erythema or swelling. No ulceration. White in color. 18 g insertion into lesion with scant bloody drainage   Eyes:      Extraocular Movements: Extraocular movements intact.      Conjunctiva/sclera: Conjunctivae normal.      Pupils: Pupils are equal, round, and reactive to light.   Cardiovascular:      Rate and Rhythm: Normal rate and regular rhythm.      Pulses: Normal pulses.      Heart sounds: Normal heart sounds.   Pulmonary:      Effort: Pulmonary effort is normal.      Breath sounds: Normal breath sounds. "   Abdominal:      General: Abdomen is flat. Bowel sounds are normal.      Palpations: Abdomen is soft.   Musculoskeletal:         General: Normal range of motion.      Cervical back: Normal range of motion.   Skin:     General: Skin is warm and dry.      Capillary Refill: Capillary refill takes less than 2 seconds.   Neurological:      General: No focal deficit present.      Mental Status: She is alert.   Psychiatric:         Mood and Affect: Mood normal.         Behavior: Behavior normal.         Thought Content: Thought content normal.         Judgment: Judgment normal.          No visits with results within 1 Day(s) from this visit.   Latest known visit with results is:   Office Visit on 07/11/2023   Component Date Value Ref Range Status    WBC 07/10/2023 6.0  3.4 - 10.8 x10E3/uL Final    RBC 07/10/2023 5.34 (H)  3.77 - 5.28 x10E6/uL Final    Hemoglobin 07/10/2023 16.0 (H)  11.1 - 15.9 g/dL Final    Hematocrit 07/10/2023 46.5  34.0 - 46.6 % Final    MCV 07/10/2023 87  79 - 97 fL Final    MCH 07/10/2023 30.0  26.6 - 33.0 pg Final    MCHC 07/10/2023 34.4  31.5 - 35.7 g/dL Final    RDW 07/10/2023 13.1  11.7 - 15.4 % Final    Platelets 07/10/2023 209  150 - 450 x10E3/uL Final    Neutrophils 07/10/2023 56  Not Estab. % Final    Lymphs 07/10/2023 30  Not Estab. % Final    Monocytes 07/10/2023 9  Not Estab. % Final    Eos 07/10/2023 3  Not Estab. % Final    Basos 07/10/2023 2  Not Estab. % Final    Neutrophils (Absolute) 07/10/2023 3.4  1.4 - 7.0 x10E3/uL Final    Lymphs (Absolute) 07/10/2023 1.8  0.7 - 3.1 x10E3/uL Final    Monocytes(Absolute) 07/10/2023 0.6  0.1 - 0.9 x10E3/uL Final    Eos (Absolute) 07/10/2023 0.2  0.0 - 0.4 x10E3/uL Final    Baso (Absolute) 07/10/2023 0.1  0.0 - 0.2 x10E3/uL Final    Immature Granulocytes 07/10/2023 0  Not Estab. % Final    Glucose 07/10/2023 98  70 - 99 mg/dL Final    BUN 07/10/2023 11  6 - 24 mg/dL Final    Creatinine 07/10/2023 0.97  0.57 - 1.00 mg/dL Final    eGFR 07/10/2023 69   >59 mL/min/1.73 Final    BUN/Creatinine Ratio 07/10/2023 11  9 - 23 Final    Sodium 07/10/2023 140  134 - 144 mmol/L Final    Potassium 07/10/2023 4.2  3.5 - 5.2 mmol/L Final    Chloride 07/10/2023 104  96 - 106 mmol/L Final    CO2 07/10/2023 21  20 - 29 mmol/L Final    Calcium 07/10/2023 9.3  8.7 - 10.2 mg/dL Final    Protein, Total 07/10/2023 6.2  6.0 - 8.5 g/dL Final    Albumin 07/10/2023 4.1  3.8 - 4.9 g/dL Final                  **Please note reference interval change**    Globulin, Total 07/10/2023 2.1  1.5 - 4.5 g/dL Final    Albumin/Globulin Ratio 07/10/2023 2.0  1.2 - 2.2 Final    Total Bilirubin 07/10/2023 0.4  0.0 - 1.2 mg/dL Final    Alkaline Phosphatase 07/10/2023 99  44 - 121 IU/L Final    AST 07/10/2023 22  0 - 40 IU/L Final    ALT 07/10/2023 27  0 - 32 IU/L Final    Hemoglobin A1c 07/10/2023 5.8 (H)  4.8 - 5.6 % Final    Comment:          Prediabetes: 5.7 - 6.4           Diabetes: >6.4           Glycemic control for adults with diabetes: <7.0      Cholesterol 07/10/2023 160  100 - 199 mg/dL Final    Triglycerides 07/10/2023 151 (H)  0 - 149 mg/dL Final    HDL 07/10/2023 38 (L)  >39 mg/dL Final    VLDL Cholesterol Paul 07/10/2023 27  5 - 40 mg/dL Final    LDL Calculated 07/10/2023 95  0 - 99 mg/dL Final    Hep C Virus Ab Signal/Cutoff 07/10/2023 Non Reactive  Non Reactive Final    Comment: HCV antibody alone does not differentiate between previously  resolved infection and active infection. Equivocal and Reactive  HCV antibody results should be followed up with an HCV RNA test  to support the diagnosis of active HCV infection.      HIV Screen 4th Generation wRfx 07/10/2023 Non Reactive  Non Reactive Final    Comment: HIV Negative  HIV-1/HIV-2 antibodies and HIV-1 p24 antigen were NOT detected.  There is no laboratory evidence of HIV infection.      Iron 07/10/2023 83  27 - 159 ug/dL Final    TSH 07/10/2023 1.570  0.450 - 4.500 uIU/mL Final          ASSESSMENT/PLAN:    1. Oral infection  -      clindamycin (CLEOCIN) 300 MG capsule; Take 1 capsule (300 mg total) by mouth every 8 (eight) hours. for 7 days  Dispense: 21 capsule; Refill: 0  -     LIDOcaine viscous HCl 2% (XYLOCAINE) 2 % Soln; by Mucous Membrane route every 3 (three) hours. for 7 days  Dispense: 20 mL; Refill: 0  -     chlorhexidine (PERIDEX) 0.12 % solution; Use as directed 15 mLs in the mouth or throat 2 (two) times daily. for 14 days  Dispense: 420 mL; Refill: 0    Other orders  -     Discontinue: chlorhexidine (PERIDEX) 0.12 % solution; Use as directed 15 mLs in the mouth or throat 2 (two) times daily. for 14 days  Dispense: 420 mL; Refill: 0         No results found for this or any previous visit (from the past 24 hour(s)).    Follow Up:  Follow up in about 2 weeks (around 2/5/2024) for Clinic Follow Up.      Discussed the diagnosis, prognosis, plan of care, chronic nature of and indications for, risks and benefits of treatment for conditions.  Continue all medications as prescribed unless otherwise noted.   Call if patient develops other symptoms or if not better in 2-3 days and sooner if worse. Emphasized the importance of compliance with all recommendations, including medication use and timely follow up. Instructed to return as noted be or sooner if patient develops any other problems or if there are any other additional questions or concerns.

## 2024-01-22 ENCOUNTER — OFFICE VISIT (OUTPATIENT)
Dept: FAMILY MEDICINE | Facility: CLINIC | Age: 54
End: 2024-01-22
Payer: COMMERCIAL

## 2024-01-22 VITALS
DIASTOLIC BLOOD PRESSURE: 76 MMHG | WEIGHT: 177 LBS | BODY MASS INDEX: 30.22 KG/M2 | RESPIRATION RATE: 18 BRPM | HEIGHT: 64 IN | OXYGEN SATURATION: 97 % | TEMPERATURE: 97 F | HEART RATE: 77 BPM | SYSTOLIC BLOOD PRESSURE: 138 MMHG

## 2024-01-22 DIAGNOSIS — K12.2 ORAL INFECTION: Primary | ICD-10-CM

## 2024-01-22 DIAGNOSIS — K13.79 MOUTH PAIN: ICD-10-CM

## 2024-01-22 PROCEDURE — 3008F BODY MASS INDEX DOCD: CPT | Mod: CPTII,,, | Performed by: NURSE PRACTITIONER

## 2024-01-22 PROCEDURE — 1159F MED LIST DOCD IN RCRD: CPT | Mod: CPTII,,, | Performed by: NURSE PRACTITIONER

## 2024-01-22 PROCEDURE — 3078F DIAST BP <80 MM HG: CPT | Mod: CPTII,,, | Performed by: NURSE PRACTITIONER

## 2024-01-22 PROCEDURE — 96372 THER/PROPH/DIAG INJ SC/IM: CPT | Mod: ,,, | Performed by: NURSE PRACTITIONER

## 2024-01-22 PROCEDURE — 99214 OFFICE O/P EST MOD 30 MIN: CPT | Mod: 25,,, | Performed by: NURSE PRACTITIONER

## 2024-01-22 PROCEDURE — 3075F SYST BP GE 130 - 139MM HG: CPT | Mod: CPTII,,, | Performed by: NURSE PRACTITIONER

## 2024-01-22 PROCEDURE — 1160F RVW MEDS BY RX/DR IN RCRD: CPT | Mod: CPTII,,, | Performed by: NURSE PRACTITIONER

## 2024-01-22 RX ORDER — ALBUTEROL SULFATE 0.83 MG/ML
2.5 SOLUTION RESPIRATORY (INHALATION) 4 TIMES DAILY PRN
COMMUNITY
Start: 2022-10-20 | End: 2024-01-22

## 2024-01-22 RX ORDER — CLINDAMYCIN HYDROCHLORIDE 300 MG/1
300 CAPSULE ORAL EVERY 8 HOURS
Qty: 21 CAPSULE | Refills: 0 | Status: SHIPPED | OUTPATIENT
Start: 2024-01-22 | End: 2024-01-29

## 2024-01-22 RX ORDER — ALBUTEROL SULFATE 90 UG/1
2 AEROSOL, METERED RESPIRATORY (INHALATION) EVERY 6 HOURS PRN
COMMUNITY

## 2024-01-22 RX ORDER — CHLORHEXIDINE GLUCONATE ORAL RINSE 1.2 MG/ML
15 SOLUTION DENTAL 2 TIMES DAILY
Qty: 420 ML | Refills: 0 | Status: SHIPPED | OUTPATIENT
Start: 2024-01-22 | End: 2024-01-22

## 2024-01-22 RX ORDER — LIDOCAINE HYDROCHLORIDE 20 MG/ML
SOLUTION OROPHARYNGEAL
Qty: 20 ML | Refills: 0 | Status: SHIPPED | OUTPATIENT
Start: 2024-01-22 | End: 2024-01-29

## 2024-01-22 RX ORDER — KETOROLAC TROMETHAMINE 30 MG/ML
60 INJECTION, SOLUTION INTRAMUSCULAR; INTRAVENOUS
Status: COMPLETED | OUTPATIENT
Start: 2024-01-22 | End: 2024-01-22

## 2024-01-22 RX ORDER — CHLORHEXIDINE GLUCONATE ORAL RINSE 1.2 MG/ML
15 SOLUTION DENTAL 2 TIMES DAILY
Qty: 420 ML | Refills: 0 | Status: SHIPPED | OUTPATIENT
Start: 2024-01-22 | End: 2024-02-05

## 2024-01-22 RX ADMIN — KETOROLAC TROMETHAMINE 60 MG: 30 INJECTION, SOLUTION INTRAMUSCULAR; INTRAVENOUS at 02:01

## 2024-02-05 ENCOUNTER — OFFICE VISIT (OUTPATIENT)
Dept: FAMILY MEDICINE | Facility: CLINIC | Age: 54
End: 2024-02-05
Payer: COMMERCIAL

## 2024-02-05 VITALS
OXYGEN SATURATION: 97 % | WEIGHT: 176 LBS | DIASTOLIC BLOOD PRESSURE: 68 MMHG | HEIGHT: 64 IN | TEMPERATURE: 97 F | BODY MASS INDEX: 30.05 KG/M2 | HEART RATE: 60 BPM | SYSTOLIC BLOOD PRESSURE: 136 MMHG | RESPIRATION RATE: 20 BRPM

## 2024-02-05 DIAGNOSIS — E78.2 MIXED HYPERLIPIDEMIA: ICD-10-CM

## 2024-02-05 DIAGNOSIS — I10 PRIMARY HYPERTENSION: ICD-10-CM

## 2024-02-05 DIAGNOSIS — J30.2 SEASONAL ALLERGIC RHINITIS, UNSPECIFIED TRIGGER: ICD-10-CM

## 2024-02-05 DIAGNOSIS — F32.A ANXIETY AND DEPRESSION: Primary | ICD-10-CM

## 2024-02-05 DIAGNOSIS — F41.9 ANXIETY AND DEPRESSION: Primary | ICD-10-CM

## 2024-02-05 DIAGNOSIS — Z72.0 CURRENT NICOTINE USE: ICD-10-CM

## 2024-02-05 PROCEDURE — 99214 OFFICE O/P EST MOD 30 MIN: CPT | Mod: ,,, | Performed by: NURSE PRACTITIONER

## 2024-02-05 PROCEDURE — 3078F DIAST BP <80 MM HG: CPT | Mod: CPTII,,, | Performed by: NURSE PRACTITIONER

## 2024-02-05 PROCEDURE — 1159F MED LIST DOCD IN RCRD: CPT | Mod: CPTII,,, | Performed by: NURSE PRACTITIONER

## 2024-02-05 PROCEDURE — 3075F SYST BP GE 130 - 139MM HG: CPT | Mod: CPTII,,, | Performed by: NURSE PRACTITIONER

## 2024-02-05 PROCEDURE — 1160F RVW MEDS BY RX/DR IN RCRD: CPT | Mod: CPTII,,, | Performed by: NURSE PRACTITIONER

## 2024-02-05 PROCEDURE — 3008F BODY MASS INDEX DOCD: CPT | Mod: CPTII,,, | Performed by: NURSE PRACTITIONER

## 2024-02-05 RX ORDER — HYDROXYZINE HYDROCHLORIDE 25 MG/1
25 TABLET, FILM COATED ORAL NIGHTLY PRN
Qty: 30 TABLET | Refills: 0 | Status: SHIPPED | OUTPATIENT
Start: 2024-02-05 | End: 2024-02-26 | Stop reason: SDUPTHER

## 2024-02-05 RX ORDER — BUSPIRONE HYDROCHLORIDE 5 MG/1
5 TABLET ORAL 2 TIMES DAILY
Qty: 60 TABLET | Refills: 5 | Status: SHIPPED | OUTPATIENT
Start: 2024-02-05 | End: 2024-02-26 | Stop reason: DRUGHIGH

## 2024-02-05 NOTE — ASSESSMENT & PLAN NOTE
Taking celexa 40 mg but sense impending anxiety especially at night despite medications. Buspar 5 mg bid for anxiety. In past took ativan and this worked well. Vistaril 25 mg at bedtime.

## 2024-02-05 NOTE — ASSESSMENT & PLAN NOTE
Lipitor 80 mg qhs. Will notify with results of lab draw when available. Continue current treatment until results available.

## 2024-02-05 NOTE — ASSESSMENT & PLAN NOTE
Smoking 1-1.5 pack daily and planning to stop on 2/14/24. Declines anything other than nicotine patches.

## 2024-02-05 NOTE — PROGRESS NOTES
"Subjective:       Patient ID: Mary Salguero is a 54 y.o. female.    Chief Complaint: Follow-up (2 wk on lesion to lip. Pt states that lesion has resolved. Pt also co anxiety while on celexa.)    Diagnosed with JOSE but couldn't get used to CPAP and sent CPAP back. Last two months increased feeling of unease with epigastric pain, unable to fall asleep, awakening with epigastric discomfort. Muscle tension. Feeling resting less restfully with increased anxiety.     Follow-up  Associated symptoms include abdominal pain and fatigue. Pertinent negatives include no arthralgias, change in bowel habit, chills, fever or nausea.     Review of Systems   Constitutional:  Positive for fatigue. Negative for activity change, appetite change, chills and fever.   HENT:  Negative for rhinorrhea and sinus pressure/congestion.    Gastrointestinal:  Positive for abdominal pain. Negative for abdominal distention, change in bowel habit and nausea.   Genitourinary:  Negative for difficulty urinating, dyspareunia, dysuria, flank pain, frequency, genital sores, hematuria, pelvic pain and urgency.   Musculoskeletal:  Negative for arthralgias.   Allergic/Immunologic: Negative for environmental allergies, food allergies and immunocompromised state.   Hematological:  Negative for adenopathy.   Psychiatric/Behavioral:  Positive for agitation and sleep disturbance. Negative for confusion and dysphoric mood. The patient is nervous/anxious.          Objective:      Vitals:    02/05/24 1041   BP: 136/68   Pulse: 60   Resp: 20   Temp: 97.2 °F (36.2 °C)   SpO2: 97%   Weight: 79.8 kg (176 lb)   Height: 5' 4" (1.626 m)       Physical Exam  Vitals and nursing note reviewed.   Constitutional:       General: She is not in acute distress.     Appearance: She is not ill-appearing.   HENT:      Head: Normocephalic and atraumatic.      Nose: Nose normal.      Mouth/Throat:      Mouth: Mucous membranes are moist.      Pharynx: Oropharynx is clear.   Eyes:      " General: No scleral icterus.     Extraocular Movements: Extraocular movements intact.      Conjunctiva/sclera: Conjunctivae normal.      Pupils: Pupils are equal, round, and reactive to light.   Neck:      Vascular: No carotid bruit.   Cardiovascular:      Rate and Rhythm: Normal rate and regular rhythm.      Pulses: Normal pulses.      Heart sounds: Normal heart sounds. No murmur heard.     No friction rub. No gallop.   Pulmonary:      Effort: Pulmonary effort is normal. No respiratory distress.      Breath sounds: Normal breath sounds. No wheezing, rhonchi or rales.   Abdominal:      General: Abdomen is flat. Bowel sounds are normal. There is no distension.      Palpations: Abdomen is soft. There is no mass.      Tenderness: There is no abdominal tenderness.   Musculoskeletal:         General: Normal range of motion.      Cervical back: Normal range of motion and neck supple.   Skin:     General: Skin is warm and dry.   Neurological:      General: No focal deficit present.      Mental Status: She is alert.   Psychiatric:         Mood and Affect: Mood normal.         Assessment/Plan:     1. Anxiety and depression  Assessment & Plan:  Taking celexa 40 mg but sense impending anxiety especially at night despite medications. Buspar 5 mg bid for anxiety. In past took ativan and this worked well. Vistaril 25 mg at bedtime.     Orders:  -     busPIRone (BUSPAR) 5 MG Tab; Take 1 tablet (5 mg total) by mouth 2 (two) times daily.  Dispense: 60 tablet; Refill: 5  -     hydrOXYzine HCL (ATARAX) 25 MG tablet; Take 1 tablet (25 mg total) by mouth nightly as needed for Anxiety.  Dispense: 30 tablet; Refill: 0    2. Primary hypertension  Assessment & Plan:  Toprol xl 25 mg daily. Goal for blood pressure <130/80. Low sodium diet.       3. Current nicotine use  Assessment & Plan:  Smoking 1-1.5 pack daily and planning to stop on 2/14/24. Declines anything other than nicotine patches.       4. Seasonal allergic rhinitis, unspecified  trigger  Assessment & Plan:  Singulair 10 mg not needing asthma medications at this time. Denied chest congestion.       5. Mixed hyperlipidemia  Assessment & Plan:  Lipitor 80 mg qhs. Will notify with results of lab draw when available. Continue current treatment until results available.            No follow-ups on file.          Discussed the diagnosis, prognosis, plan of care, chronic nature of and indications for, risks and benefits of treatment for conditions.  Continue all medications as prescribed unless otherwise noted.   Call if patient develops other symptoms or if not better in 2-3 days and sooner if worse. Emphasized the importance of compliance with all recommendations, including medication use and timely follow up. Instructed to return as noted be or sooner if patient develops any other problems or if there are any other additional questions or concerns.

## 2024-02-09 DIAGNOSIS — F41.9 ANXIETY AND DEPRESSION: Primary | ICD-10-CM

## 2024-02-09 DIAGNOSIS — F32.A ANXIETY AND DEPRESSION: Primary | ICD-10-CM

## 2024-02-12 RX ORDER — CITALOPRAM 40 MG/1
40 TABLET, FILM COATED ORAL DAILY
Qty: 90 TABLET | Refills: 1 | Status: SHIPPED | OUTPATIENT
Start: 2024-02-12 | End: 2024-02-26 | Stop reason: SDUPTHER

## 2024-02-15 DIAGNOSIS — E78.5 HYPERLIPIDEMIA, UNSPECIFIED HYPERLIPIDEMIA TYPE: ICD-10-CM

## 2024-02-15 DIAGNOSIS — I10 PRIMARY HYPERTENSION: Primary | ICD-10-CM

## 2024-02-15 RX ORDER — METOPROLOL SUCCINATE 25 MG/1
25 TABLET, EXTENDED RELEASE ORAL
Qty: 90 TABLET | Refills: 1 | Status: SHIPPED | OUTPATIENT
Start: 2024-02-15 | End: 2024-02-26 | Stop reason: SDUPTHER

## 2024-02-15 RX ORDER — ATORVASTATIN CALCIUM 80 MG/1
80 TABLET, FILM COATED ORAL
Qty: 90 TABLET | Refills: 1 | Status: SHIPPED | OUTPATIENT
Start: 2024-02-15 | End: 2024-02-26 | Stop reason: SDUPTHER

## 2024-02-26 ENCOUNTER — OFFICE VISIT (OUTPATIENT)
Dept: FAMILY MEDICINE | Facility: CLINIC | Age: 54
End: 2024-02-26
Payer: COMMERCIAL

## 2024-02-26 VITALS
BODY MASS INDEX: 29.71 KG/M2 | OXYGEN SATURATION: 95 % | TEMPERATURE: 97 F | WEIGHT: 174 LBS | SYSTOLIC BLOOD PRESSURE: 115 MMHG | RESPIRATION RATE: 20 BRPM | DIASTOLIC BLOOD PRESSURE: 67 MMHG | HEART RATE: 54 BPM | HEIGHT: 64 IN

## 2024-02-26 DIAGNOSIS — I10 PRIMARY HYPERTENSION: ICD-10-CM

## 2024-02-26 DIAGNOSIS — J32.9 SINUSITIS, UNSPECIFIED CHRONICITY, UNSPECIFIED LOCATION: ICD-10-CM

## 2024-02-26 DIAGNOSIS — E78.2 MIXED HYPERLIPIDEMIA: ICD-10-CM

## 2024-02-26 DIAGNOSIS — Z72.0 CURRENT NICOTINE USE: ICD-10-CM

## 2024-02-26 DIAGNOSIS — F41.9 ANXIETY AND DEPRESSION: Primary | ICD-10-CM

## 2024-02-26 DIAGNOSIS — G47.19 EXCESSIVE DAYTIME SLEEPINESS: ICD-10-CM

## 2024-02-26 DIAGNOSIS — E78.5 HYPERLIPIDEMIA, UNSPECIFIED HYPERLIPIDEMIA TYPE: ICD-10-CM

## 2024-02-26 DIAGNOSIS — F32.A ANXIETY AND DEPRESSION: Primary | ICD-10-CM

## 2024-02-26 DIAGNOSIS — E55.9 VITAMIN D DEFICIENCY: ICD-10-CM

## 2024-02-26 DIAGNOSIS — D75.1 ERYTHROCYTOSIS: ICD-10-CM

## 2024-02-26 DIAGNOSIS — J41.0 SIMPLE CHRONIC BRONCHITIS: ICD-10-CM

## 2024-02-26 DIAGNOSIS — J30.2 SEASONAL ALLERGIC RHINITIS, UNSPECIFIED TRIGGER: ICD-10-CM

## 2024-02-26 PROCEDURE — 3074F SYST BP LT 130 MM HG: CPT | Mod: CPTII,,, | Performed by: NURSE PRACTITIONER

## 2024-02-26 PROCEDURE — 1159F MED LIST DOCD IN RCRD: CPT | Mod: CPTII,,, | Performed by: NURSE PRACTITIONER

## 2024-02-26 PROCEDURE — 99214 OFFICE O/P EST MOD 30 MIN: CPT | Mod: ,,, | Performed by: NURSE PRACTITIONER

## 2024-02-26 PROCEDURE — 3008F BODY MASS INDEX DOCD: CPT | Mod: CPTII,,, | Performed by: NURSE PRACTITIONER

## 2024-02-26 PROCEDURE — 1160F RVW MEDS BY RX/DR IN RCRD: CPT | Mod: CPTII,,, | Performed by: NURSE PRACTITIONER

## 2024-02-26 PROCEDURE — 3078F DIAST BP <80 MM HG: CPT | Mod: CPTII,,, | Performed by: NURSE PRACTITIONER

## 2024-02-26 RX ORDER — METOPROLOL SUCCINATE 25 MG/1
25 TABLET, EXTENDED RELEASE ORAL DAILY
Qty: 90 TABLET | Refills: 1 | Status: SHIPPED | OUTPATIENT
Start: 2024-02-26

## 2024-02-26 RX ORDER — CITALOPRAM 40 MG/1
40 TABLET, FILM COATED ORAL DAILY
Qty: 90 TABLET | Refills: 1 | Status: SHIPPED | OUTPATIENT
Start: 2024-02-26

## 2024-02-26 RX ORDER — BUSPIRONE HYDROCHLORIDE 10 MG/1
10 TABLET ORAL 3 TIMES DAILY
Qty: 90 TABLET | Refills: 11 | Status: SHIPPED | OUTPATIENT
Start: 2024-02-26 | End: 2025-02-25

## 2024-02-26 RX ORDER — ATORVASTATIN CALCIUM 80 MG/1
80 TABLET, FILM COATED ORAL NIGHTLY
Qty: 90 TABLET | Refills: 1 | Status: SHIPPED | OUTPATIENT
Start: 2024-02-26

## 2024-02-26 RX ORDER — MONTELUKAST SODIUM 10 MG/1
10 TABLET ORAL NIGHTLY
Qty: 90 TABLET | Refills: 1 | Status: SHIPPED | OUTPATIENT
Start: 2024-02-26 | End: 2024-06-18 | Stop reason: SDUPTHER

## 2024-02-26 RX ORDER — HYDROXYZINE HYDROCHLORIDE 25 MG/1
25 TABLET, FILM COATED ORAL NIGHTLY PRN
Qty: 30 TABLET | Refills: 0 | Status: SHIPPED | OUTPATIENT
Start: 2024-02-26 | End: 2024-04-17 | Stop reason: SDUPTHER

## 2024-02-26 NOTE — ASSESSMENT & PLAN NOTE
Toprol xl 25 mg daily. The current medical regimen is effective;  continue present plan and medications.

## 2024-02-26 NOTE — ASSESSMENT & PLAN NOTE
Recommend increasing water intake, asa 81 mg daily. Donate blood to lower, stop smoking. Recheck cbc in 6 months.

## 2024-02-26 NOTE — ASSESSMENT & PLAN NOTE
Xyzal 5 mg daily prn allergies. The current medical regimen is effective;  continue present plan and medications.

## 2024-02-26 NOTE — ASSESSMENT & PLAN NOTE
Still mouth breathing with day time excessive sleeping tried CPAP but not able to tolerate and sent CPAP to supplier.

## 2024-02-26 NOTE — PROGRESS NOTES
"Subjective:       Patient ID: Mary Salguero is a 54 y.o. female.    Chief Complaint: Follow-up (Pt here for three week follow up on anxiety medicine and to review labs, she says medicine is working pretty goof for her. )    Follow-up      Review of Systems      Objective:      Vitals:    02/26/24 0959   BP: 115/67   Pulse: (!) 54   Resp: 20   Temp: 97.4 °F (36.3 °C)   SpO2: 95%   Weight: 78.9 kg (174 lb)   Height: 5' 4" (1.626 m)       Physical Exam    Assessment/Plan:     1. Anxiety and depression  Assessment & Plan:  Celexa 40 mg daily. Finds that the BuSpar is working but not quite strong enough most of the time during the day feels that she could double up with it we will increase to BuSpar 10 mg b.i.d. for anxiety she does take the Atarax if needed for insomnia and it does work when she takes it.    Orders:  -     busPIRone (BUSPAR) 10 MG tablet; Take 1 tablet (10 mg total) by mouth 3 (three) times daily.  Dispense: 90 tablet; Refill: 11  -     citalopram (CELEXA) 40 MG tablet; Take 1 tablet (40 mg total) by mouth once daily.  Dispense: 90 tablet; Refill: 1  -     hydrOXYzine HCL (ATARAX) 25 MG tablet; Take 1 tablet (25 mg total) by mouth nightly as needed for Anxiety.  Dispense: 30 tablet; Refill: 0    2. Primary hypertension  Assessment & Plan:  Toprol xl 25 mg daily. The current medical regimen is effective;  continue present plan and medications.      Orders:  -     metoprolol succinate (TOPROL-XL) 25 MG 24 hr tablet; Take 1 tablet (25 mg total) by mouth once daily.  Dispense: 90 tablet; Refill: 1    3. Hyperlipidemia, unspecified hyperlipidemia type  -     atorvastatin (LIPITOR) 80 MG tablet; Take 1 tablet (80 mg total) by mouth every evening.  Dispense: 90 tablet; Refill: 1    4. Mixed hyperlipidemia  Assessment & Plan:  Lipitor 80 mg qhs. The current medical regimen is effective;  continue present plan and medications.recheck cmp, flp in 6 months        5. Erythrocytosis  Assessment & Plan:  Recommend " increasing water intake, asa 81 mg daily. Donate blood to lower, stop smoking. Recheck cbc in 6 months.       6. Current nicotine use  Assessment & Plan:  It is very important that she quit smoking. There are various alternatives available to help with this difficult task, but first and foremost, she must make a firm commitment and decision to quit. The nature of nicotine addiction is discussed. The usefulness of behavioral therapy is discussed and suggested.  The correct use, cost and side effects of nicotine replacement therapy such as gum or patches is discussed. Bupropion and its cost (sometimes not covered fully by insurance) and side effects are reviewed. The quit rates are discussed. I recommend she not allow potential costs of treatment to deter her from using nicotine replacement therapy or bupropion, as the long term economic and health benefits are obvious. Counseling 3 minutes.       7. Vitamin D deficiency  Assessment & Plan:  Taking at 1000 daily but increasea to 2000 daily and rechecking vitamin-D in six-month       8. Seasonal allergic rhinitis, unspecified trigger  Assessment & Plan:  Xyzal 5 mg daily prn allergies. The current medical regimen is effective;  continue present plan and medications.      Orders:  -     montelukast (SINGULAIR) 10 mg tablet; Take 1 tablet (10 mg total) by mouth every evening.  Dispense: 90 tablet; Refill: 1  -     hydrOXYzine HCL (ATARAX) 25 MG tablet; Take 1 tablet (25 mg total) by mouth nightly as needed for Anxiety.  Dispense: 30 tablet; Refill: 0    9. Excessive daytime sleepiness  Assessment & Plan:  Still mouth breathing with day time excessive sleeping tried CPAP but not able to tolerate and sent CPAP to supplier.       10. Sinusitis, unspecified chronicity, unspecified location  -     montelukast (SINGULAIR) 10 mg tablet; Take 1 tablet (10 mg total) by mouth every evening.  Dispense: 90 tablet; Refill: 1    11. Simple chronic bronchitis  Assessment &  Plan:  Trelegy 100/62.5/25 one puff daily. Rinse mouth after use. Ventolin prn, stop smoking. Declines PFT or cxr at this time.          Follow up in about 3 months (around 5/26/2024).          Discussed the diagnosis, prognosis, plan of care, chronic nature of and indications for, risks and benefits of treatment for conditions.  Continue all medications as prescribed unless otherwise noted.   Call if patient develops other symptoms or if not better in 2-3 days and sooner if worse. Emphasized the importance of compliance with all recommendations, including medication use and timely follow up. Instructed to return as noted be or sooner if patient develops any other problems or if there are any other additional questions or concerns.

## 2024-02-26 NOTE — ASSESSMENT & PLAN NOTE
It is very important that she quit smoking. There are various alternatives available to help with this difficult task, but first and foremost, she must make a firm commitment and decision to quit. The nature of nicotine addiction is discussed. The usefulness of behavioral therapy is discussed and suggested.  The correct use, cost and side effects of nicotine replacement therapy such as gum or patches is discussed. Bupropion and its cost (sometimes not covered fully by insurance) and side effects are reviewed. The quit rates are discussed. I recommend she not allow potential costs of treatment to deter her from using nicotine replacement therapy or bupropion, as the long term economic and health benefits are obvious. Counseling 3 minutes.

## 2024-02-26 NOTE — ASSESSMENT & PLAN NOTE
Trelegy 100/62.5/25 one puff daily. Rinse mouth after use. Ventolin prn, stop smoking. Declines PFT or cxr at this time.

## 2024-02-26 NOTE — ASSESSMENT & PLAN NOTE
Lipitor 80 mg qhs. The current medical regimen is effective;  continue present plan and medications.recheck cmp, flp in 6 months

## 2024-02-26 NOTE — ASSESSMENT & PLAN NOTE
Celexa 40 mg daily. Finds that the BuSpar is working but not quite strong enough most of the time during the day feels that she could double up with it we will increase to BuSpar 10 mg b.i.d. for anxiety she does take the Atarax if needed for insomnia and it does work when she takes it.

## 2024-04-17 DIAGNOSIS — F32.A ANXIETY AND DEPRESSION: ICD-10-CM

## 2024-04-17 DIAGNOSIS — J30.2 SEASONAL ALLERGIC RHINITIS, UNSPECIFIED TRIGGER: ICD-10-CM

## 2024-04-17 DIAGNOSIS — F41.9 ANXIETY AND DEPRESSION: ICD-10-CM

## 2024-04-17 RX ORDER — HYDROXYZINE HYDROCHLORIDE 25 MG/1
25 TABLET, FILM COATED ORAL NIGHTLY PRN
Qty: 30 TABLET | Refills: 0 | Status: SHIPPED | OUTPATIENT
Start: 2024-04-17 | End: 2024-05-28 | Stop reason: SDUPTHER

## 2024-05-28 DIAGNOSIS — J30.2 SEASONAL ALLERGIC RHINITIS, UNSPECIFIED TRIGGER: ICD-10-CM

## 2024-05-28 DIAGNOSIS — F32.A ANXIETY AND DEPRESSION: ICD-10-CM

## 2024-05-28 DIAGNOSIS — F41.9 ANXIETY AND DEPRESSION: ICD-10-CM

## 2024-05-28 RX ORDER — HYDROXYZINE HYDROCHLORIDE 25 MG/1
25 TABLET, FILM COATED ORAL NIGHTLY PRN
Qty: 30 TABLET | Refills: 0 | Status: SHIPPED | OUTPATIENT
Start: 2024-05-28

## 2024-06-18 DIAGNOSIS — J32.9 SINUSITIS, UNSPECIFIED CHRONICITY, UNSPECIFIED LOCATION: ICD-10-CM

## 2024-06-18 DIAGNOSIS — J30.2 SEASONAL ALLERGIC RHINITIS, UNSPECIFIED TRIGGER: ICD-10-CM

## 2024-06-18 RX ORDER — MONTELUKAST SODIUM 10 MG/1
10 TABLET ORAL NIGHTLY
Qty: 90 TABLET | Refills: 1 | Status: SHIPPED | OUTPATIENT
Start: 2024-06-18

## 2024-06-25 DIAGNOSIS — F32.A ANXIETY AND DEPRESSION: ICD-10-CM

## 2024-06-25 DIAGNOSIS — F41.9 ANXIETY AND DEPRESSION: ICD-10-CM

## 2024-06-25 DIAGNOSIS — J30.2 SEASONAL ALLERGIC RHINITIS, UNSPECIFIED TRIGGER: ICD-10-CM

## 2024-06-25 RX ORDER — HYDROXYZINE HYDROCHLORIDE 25 MG/1
25 TABLET, FILM COATED ORAL NIGHTLY PRN
Qty: 30 TABLET | Refills: 0 | Status: SHIPPED | OUTPATIENT
Start: 2024-06-25

## 2024-07-24 DIAGNOSIS — F41.9 ANXIETY AND DEPRESSION: ICD-10-CM

## 2024-07-24 DIAGNOSIS — F32.A ANXIETY AND DEPRESSION: ICD-10-CM

## 2024-07-24 DIAGNOSIS — J30.2 SEASONAL ALLERGIC RHINITIS, UNSPECIFIED TRIGGER: ICD-10-CM

## 2024-07-24 RX ORDER — HYDROXYZINE HYDROCHLORIDE 25 MG/1
25 TABLET, FILM COATED ORAL NIGHTLY PRN
Qty: 30 TABLET | Refills: 0 | Status: SHIPPED | OUTPATIENT
Start: 2024-07-24

## 2024-08-21 ENCOUNTER — OFFICE VISIT (OUTPATIENT)
Dept: FAMILY MEDICINE | Facility: CLINIC | Age: 54
End: 2024-08-21
Payer: COMMERCIAL

## 2024-08-21 VITALS
SYSTOLIC BLOOD PRESSURE: 121 MMHG | DIASTOLIC BLOOD PRESSURE: 60 MMHG | TEMPERATURE: 98 F | OXYGEN SATURATION: 97 % | WEIGHT: 176 LBS | HEIGHT: 64 IN | HEART RATE: 60 BPM | BODY MASS INDEX: 30.05 KG/M2 | RESPIRATION RATE: 20 BRPM

## 2024-08-21 DIAGNOSIS — E78.2 MIXED HYPERLIPIDEMIA: ICD-10-CM

## 2024-08-21 DIAGNOSIS — F32.A ANXIETY AND DEPRESSION: ICD-10-CM

## 2024-08-21 DIAGNOSIS — J41.0 SIMPLE CHRONIC BRONCHITIS: ICD-10-CM

## 2024-08-21 DIAGNOSIS — Z12.31 SCREENING MAMMOGRAM, ENCOUNTER FOR: Primary | ICD-10-CM

## 2024-08-21 DIAGNOSIS — E78.5 HYPERLIPIDEMIA, UNSPECIFIED HYPERLIPIDEMIA TYPE: ICD-10-CM

## 2024-08-21 DIAGNOSIS — I10 PRIMARY HYPERTENSION: ICD-10-CM

## 2024-08-21 DIAGNOSIS — D75.1 ERYTHROCYTOSIS: ICD-10-CM

## 2024-08-21 DIAGNOSIS — F41.9 ANXIETY AND DEPRESSION: ICD-10-CM

## 2024-08-21 DIAGNOSIS — Z72.0 CURRENT NICOTINE USE: ICD-10-CM

## 2024-08-21 DIAGNOSIS — J30.2 SEASONAL ALLERGIC RHINITIS, UNSPECIFIED TRIGGER: ICD-10-CM

## 2024-08-21 DIAGNOSIS — J32.9 SINUSITIS, UNSPECIFIED CHRONICITY, UNSPECIFIED LOCATION: ICD-10-CM

## 2024-08-21 DIAGNOSIS — Z12.31 SCREENING MAMMOGRAM FOR BREAST CANCER: ICD-10-CM

## 2024-08-21 PROCEDURE — 3044F HG A1C LEVEL LT 7.0%: CPT | Mod: CPTII,,, | Performed by: NURSE PRACTITIONER

## 2024-08-21 PROCEDURE — 3008F BODY MASS INDEX DOCD: CPT | Mod: CPTII,,, | Performed by: NURSE PRACTITIONER

## 2024-08-21 PROCEDURE — 99214 OFFICE O/P EST MOD 30 MIN: CPT | Mod: ,,, | Performed by: NURSE PRACTITIONER

## 2024-08-21 PROCEDURE — 3074F SYST BP LT 130 MM HG: CPT | Mod: CPTII,,, | Performed by: NURSE PRACTITIONER

## 2024-08-21 PROCEDURE — 1159F MED LIST DOCD IN RCRD: CPT | Mod: CPTII,,, | Performed by: NURSE PRACTITIONER

## 2024-08-21 PROCEDURE — 1160F RVW MEDS BY RX/DR IN RCRD: CPT | Mod: CPTII,,, | Performed by: NURSE PRACTITIONER

## 2024-08-21 PROCEDURE — 3078F DIAST BP <80 MM HG: CPT | Mod: CPTII,,, | Performed by: NURSE PRACTITIONER

## 2024-08-21 RX ORDER — MONTELUKAST SODIUM 10 MG/1
10 TABLET ORAL NIGHTLY
Qty: 90 TABLET | Refills: 1 | Status: SHIPPED | OUTPATIENT
Start: 2024-08-21

## 2024-08-21 RX ORDER — CITALOPRAM 40 MG/1
40 TABLET, FILM COATED ORAL DAILY
Qty: 90 TABLET | Refills: 1 | Status: SHIPPED | OUTPATIENT
Start: 2024-08-21

## 2024-08-21 RX ORDER — BUSPIRONE HYDROCHLORIDE 10 MG/1
10 TABLET ORAL 3 TIMES DAILY
Qty: 90 TABLET | Refills: 11 | Status: SHIPPED | OUTPATIENT
Start: 2024-08-21 | End: 2025-08-21

## 2024-08-21 RX ORDER — ATORVASTATIN CALCIUM 80 MG/1
80 TABLET, FILM COATED ORAL NIGHTLY
Qty: 90 TABLET | Refills: 1 | Status: SHIPPED | OUTPATIENT
Start: 2024-08-21

## 2024-08-21 RX ORDER — METOPROLOL SUCCINATE 25 MG/1
25 TABLET, EXTENDED RELEASE ORAL DAILY
Qty: 90 TABLET | Refills: 1 | Status: SHIPPED | OUTPATIENT
Start: 2024-08-21

## 2024-08-21 RX ORDER — HYDROXYZINE HYDROCHLORIDE 25 MG/1
25 TABLET, FILM COATED ORAL NIGHTLY PRN
Qty: 90 TABLET | Refills: 1 | Status: SHIPPED | OUTPATIENT
Start: 2024-08-21

## 2024-08-21 NOTE — PROGRESS NOTES
Patient ID: Mary Salguero  : 1970     Chief Complaint: review labs    Allergies: Patient is allergic to sudafed sinus and cold.     History of Present Illness:  The patient is a 54 y.o. White female who presents to clinic for evaluation and management with a chief complaint of review labs     HPI   Patient in clinic to review labs. Taking all medications as prescribed. Taking hydroxyzine 25 mg helps to rest and decreased anxiety with celexa and buspar.   Past Medical History:  has a past medical history of Allergy and Hypertension.    Social History:  reports that she has been smoking cigarettes. She has never used smokeless tobacco. She reports that she does not drink alcohol and does not use drugs.    Care Team: Patient Care Team:  Criselda Garcia DNP as PCP - General (Family Medicine)     Current Medications:  Current Outpatient Medications   Medication Instructions    albuterol (VENTOLIN HFA) 90 mcg/actuation inhaler 2 puffs, Inhalation, Every 6 hours PRN, Rescue    atorvastatin (LIPITOR) 80 mg, Oral, Nightly    busPIRone (BUSPAR) 10 mg, Oral, 3 times daily    citalopram (CELEXA) 40 mg, Oral, Daily    fluticasone-umeclidin-vilanter (TRELEGY ELLIPTA) 100-62.5-25 mcg DsDv 1 puff, Inhalation, Daily    hydrOXYzine HCL (ATARAX) 25 mg, Oral, Nightly PRN    metoprolol succinate (TOPROL-XL) 25 mg, Oral, Daily    montelukast (SINGULAIR) 10 mg, Oral, Nightly       Review of Systems   Constitutional:  Negative for activity change and appetite change.   HENT:  Negative for nasal congestion, trouble swallowing and voice change.    Eyes: Negative.  Negative for visual disturbance.   Respiratory: Negative.  Negative for chest tightness, shortness of breath and wheezing.    Cardiovascular:  Negative for chest pain, palpitations and leg swelling.   Endocrine: Negative for cold intolerance, heat intolerance and polyuria.   Genitourinary:  Negative for bladder incontinence, difficulty urinating, flank pain and frequency.  "  Allergic/Immunologic: Negative for environmental allergies and food allergies.   Neurological:  Negative for vertigo, tremors, seizures, syncope, light-headedness, headaches and coordination difficulties.   Hematological:  Negative for adenopathy. Does not bruise/bleed easily.   Psychiatric/Behavioral:  Negative for agitation, sleep disturbance and suicidal ideas.         Visit Vitals  /60 (BP Location: Left arm, Patient Position: Sitting)   Pulse 60   Temp 98 °F (36.7 °C)   Resp 20   Ht 5' 4" (1.626 m)   Wt 79.8 kg (176 lb)   SpO2 97%   BMI 30.21 kg/m²       Physical Exam  Constitutional:       Appearance: Normal appearance.   HENT:      Head: Normocephalic.      Nose: Nose normal.      Mouth/Throat:      Mouth: Mucous membranes are dry.   Eyes:      Extraocular Movements: Extraocular movements intact.      Conjunctiva/sclera: Conjunctivae normal.   Cardiovascular:      Rate and Rhythm: Normal rate and regular rhythm.      Pulses: Normal pulses.      Heart sounds: Normal heart sounds.   Pulmonary:      Effort: Pulmonary effort is normal.      Breath sounds: Normal breath sounds.   Chest:   Breasts:     Darryl Score is 5.      Right: Normal.      Left: Normal.   Abdominal:      General: Bowel sounds are normal.      Palpations: Abdomen is soft.   Musculoskeletal:         General: Normal range of motion.      Cervical back: Normal range of motion.   Lymphadenopathy:      Upper Body:      Right upper body: No supraclavicular, axillary or pectoral adenopathy.      Left upper body: No supraclavicular, axillary or pectoral adenopathy.   Skin:     General: Skin is warm and dry.      Capillary Refill: Capillary refill takes less than 2 seconds.   Neurological:      General: No focal deficit present.      Mental Status: She is alert and oriented to person, place, and time.   Psychiatric:         Mood and Affect: Mood normal.          Labs Reviewed:  Chemistry:  Lab Results   Component Value Date     08/19/2024 "    K 3.9 08/19/2024    BUN 8 08/19/2024    CREATININE 1.11 (H) 08/19/2024    EGFRNORACEVR 58 (L) 08/19/2024    GLUCOSE 120 (H) 01/03/2022    CALCIUM 9.2 08/19/2024    ALKPHOS 106 01/03/2022    LABPROT 6.0 01/03/2022    ALBUMIN 4.1 08/19/2024    AST 21 08/19/2024    ALT 18 08/19/2024    [id number] 30.3 02/12/2024    TSH 1.060 02/12/2024        Lab Results   Component Value Date    HGBA1C 5.8 (H) 08/19/2024        Hematology:  Lab Results   Component Value Date    WBC 7.3 08/19/2024    RBC 5.45 (H) 08/19/2024    HGB 15.9 08/19/2024    HCT 47.9 (H) 08/19/2024    MCV 88 08/19/2024    MCH 29.2 08/19/2024    MCHC 33.2 08/19/2024    RDW 12.5 08/19/2024     08/19/2024    MONO 7 08/19/2024    MONO 0.5 08/19/2024    BASO 0.1 08/19/2024    EOSINOPHIL 2 08/19/2024    BASOPHIL 2 08/19/2024       Lipid Panel:  Lab Results   Component Value Date    CHOL 114 08/19/2024    HDL 40 08/19/2024    LDLCALC 51 08/19/2024    TRIG 127 08/19/2024    TOTALCHOLEST 3.8 01/03/2022        Assessment & Plan:  1. Screening mammogram, encounter for  -     Mammo Digital Screening Bilat w/ Jorge; Future; Expected date: 08/21/2024    2. Anxiety and depression  Assessment & Plan:  Celexa 40 mg daily. BuSpar 10 mg b.i.d. for anxiety and hydroxyzine 25 mg qhs for rest/anxiety. The current medical regimen is effective;  continue present plan and medications.       Orders:  -     hydrOXYzine HCL (ATARAX) 25 MG tablet; Take 1 tablet (25 mg total) by mouth nightly as needed for Anxiety.  Dispense: 90 tablet; Refill: 1  -     busPIRone (BUSPAR) 10 MG tablet; Take 1 tablet (10 mg total) by mouth 3 (three) times daily.  Dispense: 90 tablet; Refill: 11  -     citalopram (CELEXA) 40 MG tablet; Take 1 tablet (40 mg total) by mouth once daily.  Dispense: 90 tablet; Refill: 1    3. Seasonal allergic rhinitis, unspecified trigger  Assessment & Plan:  Zyrtec OTC with singulair 10 mg nightly. The current medical regimen is effective;  continue present plan and  medication     Orders:  -     hydrOXYzine HCL (ATARAX) 25 MG tablet; Take 1 tablet (25 mg total) by mouth nightly as needed for Anxiety.  Dispense: 90 tablet; Refill: 1  -     montelukast (SINGULAIR) 10 mg tablet; Take 1 tablet (10 mg total) by mouth every evening.  Dispense: 90 tablet; Refill: 1    4. Mixed hyperlipidemia  Assessment & Plan:  Lipitor 80 mg qhs. The current medical regimen is effective;  continue present plan and medications.recheck cmp, flp in 6 months         5. Primary hypertension  Assessment & Plan:  Toprol xl 25 mg daily. The current medical regimen is effective;  continue present plan and medications     Orders:  -     metoprolol succinate (TOPROL-XL) 25 MG 24 hr tablet; Take 1 tablet (25 mg total) by mouth once daily.  Dispense: 90 tablet; Refill: 1    6. Erythrocytosis  Assessment & Plan:  Recommend increasing water intake, asa 81 mg daily. Donate blood to lower, stop smoking. Recheck cbc in 6 months.       7. Current nicotine use  Assessment & Plan:  .It is very important that she quit smoking. There are various alternatives available to help with this difficult task, but first and foremost, she must make a firm commitment and decision to quit. The nature of nicotine addiction is discussed. The usefulness of behavioral therapy is discussed and suggested.  The correct use, cost and side effects of nicotine replacement therapy such as gum or patches is discussed. Bupropion and its cost (sometimes not covered fully by insurance) and side effects are reviewed. The quit rates are discussed. I recommend she not allow potential costs of treatment to deter her from using nicotine replacement therapy or bupropion, as the long term economic and health benefits are obvious. Counseling 5 minutes.         8. Hyperlipidemia, unspecified hyperlipidemia type  -     atorvastatin (LIPITOR) 80 MG tablet; Take 1 tablet (80 mg total) by mouth every evening.  Dispense: 90 tablet; Refill: 1    9. Sinusitis,  unspecified chronicity, unspecified location  -     montelukast (SINGULAIR) 10 mg tablet; Take 1 tablet (10 mg total) by mouth every evening.  Dispense: 90 tablet; Refill: 1    10. Simple chronic bronchitis  Assessment & Plan:  Not needing to use inhalers. Breath sound clear.       11. Screening mammogram for breast cancer  Assessment & Plan:  Negative family history breast cancer in family personally does fibrocystic history but no abnormals we will order mammogram Jelani and patient we will schedule.           No future appointments.      Follow up in about 6 months (around 2/21/2025). Call sooner if needed.    Criselda Garcia, DNP

## 2024-08-21 NOTE — ASSESSMENT & PLAN NOTE
Celexa 40 mg daily. BuSpar 10 mg b.i.d. for anxiety and hydroxyzine 25 mg qhs for rest/anxiety. The current medical regimen is effective;  continue present plan and medications.

## 2024-08-21 NOTE — ASSESSMENT & PLAN NOTE
Zyrtec OTC with singulair 10 mg nightly. The current medical regimen is effective;  continue present plan and medication

## 2024-08-21 NOTE — ASSESSMENT & PLAN NOTE
Negative family history breast cancer in family personally does fibrocystic history but no abnormals we will order mammogram Jelani and patient we will schedule.

## 2024-08-21 NOTE — ASSESSMENT & PLAN NOTE
Toprol xl 25 mg daily. The current medical regimen is effective;  continue present plan and medications

## 2024-08-21 NOTE — ASSESSMENT & PLAN NOTE
.It is very important that she quit smoking. There are various alternatives available to help with this difficult task, but first and foremost, she must make a firm commitment and decision to quit. The nature of nicotine addiction is discussed. The usefulness of behavioral therapy is discussed and suggested.  The correct use, cost and side effects of nicotine replacement therapy such as gum or patches is discussed. Bupropion and its cost (sometimes not covered fully by insurance) and side effects are reviewed. The quit rates are discussed. I recommend she not allow potential costs of treatment to deter her from using nicotine replacement therapy or bupropion, as the long term economic and health benefits are obvious. Counseling 5 minutes.

## 2024-08-29 ENCOUNTER — HOSPITAL ENCOUNTER (OUTPATIENT)
Dept: RADIOLOGY | Facility: HOSPITAL | Age: 54
Discharge: HOME | End: 2024-08-29
Attending: NURSE PRACTITIONER
Payer: COMMERCIAL

## 2024-08-29 DIAGNOSIS — Z12.31 SCREENING MAMMOGRAM, ENCOUNTER FOR: ICD-10-CM

## 2024-08-29 PROCEDURE — 77067 SCR MAMMO BI INCL CAD: CPT | Mod: TC

## 2024-09-10 ENCOUNTER — OFFICE VISIT (OUTPATIENT)
Dept: FAMILY MEDICINE | Facility: CLINIC | Age: 54
End: 2024-09-10
Payer: COMMERCIAL

## 2024-09-10 VITALS
BODY MASS INDEX: 30.22 KG/M2 | WEIGHT: 177 LBS | HEIGHT: 64 IN | TEMPERATURE: 97 F | DIASTOLIC BLOOD PRESSURE: 70 MMHG | OXYGEN SATURATION: 98 % | SYSTOLIC BLOOD PRESSURE: 130 MMHG | HEART RATE: 60 BPM | RESPIRATION RATE: 20 BRPM

## 2024-09-10 DIAGNOSIS — S39.012A LUMBAR STRAIN, INITIAL ENCOUNTER: Primary | ICD-10-CM

## 2024-09-10 DIAGNOSIS — J44.89 EXACERBATION OF CHRONIC BRONCHIOLITIS: ICD-10-CM

## 2024-09-10 DIAGNOSIS — J44.1 COPD WITH ACUTE EXACERBATION: ICD-10-CM

## 2024-09-10 DIAGNOSIS — B37.31 VAGINAL CANDIDA: ICD-10-CM

## 2024-09-10 PROCEDURE — 99214 OFFICE O/P EST MOD 30 MIN: CPT | Mod: 25,,, | Performed by: NURSE PRACTITIONER

## 2024-09-10 PROCEDURE — 1159F MED LIST DOCD IN RCRD: CPT | Mod: CPTII,,, | Performed by: NURSE PRACTITIONER

## 2024-09-10 PROCEDURE — 3044F HG A1C LEVEL LT 7.0%: CPT | Mod: CPTII,,, | Performed by: NURSE PRACTITIONER

## 2024-09-10 PROCEDURE — 1160F RVW MEDS BY RX/DR IN RCRD: CPT | Mod: CPTII,,, | Performed by: NURSE PRACTITIONER

## 2024-09-10 PROCEDURE — 3008F BODY MASS INDEX DOCD: CPT | Mod: CPTII,,, | Performed by: NURSE PRACTITIONER

## 2024-09-10 PROCEDURE — 3075F SYST BP GE 130 - 139MM HG: CPT | Mod: CPTII,,, | Performed by: NURSE PRACTITIONER

## 2024-09-10 PROCEDURE — 3078F DIAST BP <80 MM HG: CPT | Mod: CPTII,,, | Performed by: NURSE PRACTITIONER

## 2024-09-10 PROCEDURE — 96372 THER/PROPH/DIAG INJ SC/IM: CPT | Mod: ,,, | Performed by: NURSE PRACTITIONER

## 2024-09-10 RX ORDER — KETOROLAC TROMETHAMINE 30 MG/ML
60 INJECTION, SOLUTION INTRAMUSCULAR; INTRAVENOUS
Status: COMPLETED | OUTPATIENT
Start: 2024-09-10 | End: 2024-09-10

## 2024-09-10 RX ORDER — FLUCONAZOLE 150 MG/1
150 TABLET ORAL DAILY
Qty: 2 TABLET | Refills: 0 | Status: SHIPPED | OUTPATIENT
Start: 2024-09-10 | End: 2024-09-12

## 2024-09-10 RX ORDER — GABAPENTIN 100 MG/1
100 CAPSULE ORAL 3 TIMES DAILY
Qty: 24 CAPSULE | Refills: 1 | Status: SHIPPED | OUTPATIENT
Start: 2024-09-10

## 2024-09-10 RX ORDER — FLUTICASONE FUROATE, UMECLIDINIUM BROMIDE AND VILANTEROL TRIFENATATE 200; 62.5; 25 UG/1; UG/1; UG/1
1 POWDER RESPIRATORY (INHALATION) DAILY
Qty: 60 EACH | Refills: 1 | Status: SHIPPED | OUTPATIENT
Start: 2024-09-10

## 2024-09-10 RX ORDER — NABUMETONE 500 MG/1
500 TABLET, FILM COATED ORAL 2 TIMES DAILY PRN
Qty: 14 TABLET | Refills: 1 | Status: SHIPPED | OUTPATIENT
Start: 2024-09-10

## 2024-09-10 RX ORDER — METHOCARBAMOL 500 MG/1
500 TABLET, FILM COATED ORAL EVERY 8 HOURS
COMMUNITY
Start: 2024-09-05

## 2024-09-10 RX ORDER — DICLOFENAC SODIUM 50 MG/1
50 TABLET, DELAYED RELEASE ORAL 2 TIMES DAILY
COMMUNITY
Start: 2024-09-05

## 2024-09-10 RX ORDER — ALBUTEROL SULFATE 90 UG/1
2 INHALANT RESPIRATORY (INHALATION) EVERY 6 HOURS PRN
Qty: 18 G | Refills: 2 | Status: SHIPPED | OUTPATIENT
Start: 2024-09-10

## 2024-09-10 RX ORDER — TIZANIDINE 4 MG/1
4 TABLET ORAL EVERY 8 HOURS PRN
Qty: 20 TABLET | Refills: 0 | Status: SHIPPED | OUTPATIENT
Start: 2024-09-10

## 2024-09-10 RX ADMIN — KETOROLAC TROMETHAMINE 60 MG: 30 INJECTION, SOLUTION INTRAMUSCULAR; INTRAVENOUS at 11:09

## 2024-09-10 NOTE — PROGRESS NOTES
Patient ID: Mary Salguero  : 1970     Chief Complaint: Cough    Allergies: Patient is allergic to sudafed sinus and cold.     History of Present Illness:  The patient is a 54 y.o. White female who presents to clinic for evaluation and management with a chief complaint of Cough   HPI   Patient in clinic with bronchitis started on  with feeling viral symptoms then began to cough and chest congestion started. . Complains of constant cough. Also states that she went to Urgent Care in [place] last week for lower back pain, started on 24. Was given Diclofenac and Robaxin on 24. Medication only very little help. . Noticed some improvements. However, pain is still present. Patient states that she had urinalysis and was normal. Also yeast infection.     Past Medical History:  has a past medical history of Allergy and Hypertension.    Social History:  reports that she has been smoking cigarettes. She has never used smokeless tobacco. She reports that she does not drink alcohol and does not use drugs.    Care Team: Patient Care Team:  Criselda Garcia DNP as PCP - General (Family Medicine)     Current Medications:  Current Outpatient Medications   Medication Instructions    albuterol (VENTOLIN HFA) 90 mcg/actuation inhaler 2 puffs, Inhalation, Every 6 hours PRN, Rescue    atorvastatin (LIPITOR) 80 mg, Oral, Nightly    busPIRone (BUSPAR) 10 mg, Oral, 3 times daily    citalopram (CELEXA) 40 mg, Oral, Daily    diclofenac (VOLTAREN) 50 mg, Oral, 2 times daily    fluconazole (DIFLUCAN) 150 mg, Oral, Daily, Take one today and repeat in 3 days for yeast infection    fluticasone-umeclidin-vilanter (TRELEGY ELLIPTA) 200-62.5-25 mcg inhaler 1 puff, Inhalation, Daily    gabapentin (NEURONTIN) 100 mg, Oral, 3 times daily    hydrOXYzine HCL (ATARAX) 25 mg, Oral, Nightly PRN    methocarbamoL (ROBAXIN) 500 mg, Oral, Every 8 hours    metoprolol succinate (TOPROL-XL) 25 mg, Oral, Daily    montelukast (SINGULAIR) 10 mg,  "Oral, Nightly    nabumetone (RELAFEN) 500 mg, Oral, 2 times daily PRN    tiZANidine (ZANAFLEX) 4 mg, Oral, Every 8 hours PRN       Review of Systems   HENT:  Negative for postnasal drip.    Respiratory:  Positive for cough, shortness of breath and wheezing.    Genitourinary:  Positive for vaginal discharge (itching). Negative for dysuria.   Musculoskeletal:  Positive for arthralgias, back pain and myalgias.   Integumentary:  Negative.   Neurological: Negative.    Hematological: Negative.    Psychiatric/Behavioral: Negative.          Visit Vitals  /70 (BP Location: Left arm, Patient Position: Sitting)   Pulse 60   Temp 97.4 °F (36.3 °C)   Resp 20   Ht 5' 4" (1.626 m)   Wt 80.3 kg (177 lb)   SpO2 98%   BMI 30.38 kg/m²       Physical Exam  Cardiovascular:      Rate and Rhythm: Normal rate and regular rhythm.      Pulses: Normal pulses.      Heart sounds: Normal heart sounds.   Pulmonary:      Breath sounds: Examination of the right-middle field reveals wheezing and rhonchi. Examination of the left-middle field reveals wheezing and rhonchi. Examination of the right-lower field reveals wheezing and rhonchi. Examination of the left-lower field reveals wheezing and rhonchi. Wheezing and rhonchi present.   Musculoskeletal:        Arms:       Comments: Muscle spasm          Labs Reviewed:  Chemistry:  Lab Results   Component Value Date     08/19/2024    K 3.9 08/19/2024    BUN 8 08/19/2024    CREATININE 1.11 (H) 08/19/2024    EGFRNORACEVR 58 (L) 08/19/2024    GLUCOSE 120 (H) 01/03/2022    CALCIUM 9.2 08/19/2024    ALKPHOS 106 01/03/2022    LABPROT 6.0 01/03/2022    ALBUMIN 4.1 08/19/2024    AST 21 08/19/2024    ALT 18 08/19/2024    [id number] 30.3 02/12/2024    TSH 1.060 02/12/2024        Lab Results   Component Value Date    HGBA1C 5.8 (H) 08/19/2024        Hematology:  Lab Results   Component Value Date    WBC 7.3 08/19/2024    RBC 5.45 (H) 08/19/2024    HGB 15.9 08/19/2024    HCT 47.9 (H) 08/19/2024    MCV " 88 08/19/2024    MCH 29.2 08/19/2024    MCHC 33.2 08/19/2024    RDW 12.5 08/19/2024     08/19/2024    MONO 7 08/19/2024    MONO 0.5 08/19/2024    BASO 0.1 08/19/2024    EOSINOPHIL 2 08/19/2024    BASOPHIL 2 08/19/2024       Lipid Panel:  Lab Results   Component Value Date    CHOL 114 08/19/2024    HDL 40 08/19/2024    LDLCALC 51 08/19/2024    TRIG 127 08/19/2024    TOTALCHOLEST 3.8 01/03/2022        Assessment & Plan:  1. Lumbar strain, initial encounter  Assessment & Plan:  Zanaflex 4 mg bid prn muscle spasm. Toradol. Neurontin 100 mg tid prn nerve pain. Relafen 500 bid prn, chiropractor.       Orders:  -     ketorolac injection 60 mg  -     tiZANidine (ZANAFLEX) 4 MG tablet; Take 1 tablet (4 mg total) by mouth every 8 (eight) hours as needed (muscle spasm).  Dispense: 20 tablet; Refill: 0  -     gabapentin (NEURONTIN) 100 MG capsule; Take 1 capsule (100 mg total) by mouth 3 (three) times daily.  Dispense: 24 capsule; Refill: 1  -     nabumetone (RELAFEN) 500 MG tablet; Take 1 tablet (500 mg total) by mouth 2 (two) times daily as needed for Pain.  Dispense: 14 tablet; Refill: 1    2. Exacerbation of chronic bronchiolitis  -     albuterol (VENTOLIN HFA) 90 mcg/actuation inhaler; Inhale 2 puffs into the lungs every 6 (six) hours as needed for Wheezing. Rescue  Dispense: 18 g; Refill: 2  -     fluticasone-umeclidin-vilanter (TRELEGY ELLIPTA) 200-62.5-25 mcg inhaler; Inhale 1 puff into the lungs once daily.  Dispense: 60 each; Refill: 1    3. COPD with acute exacerbation  Assessment & Plan:  Doxycycline 100 mg bid for 7 days for infection. Trelegy 200 mg daily albuterol tid prn. It is very important that she quit smoking. There are various alternatives available to help with this difficult task, but first and foremost, she must make a firm commitment and decision to quit. The nature of nicotine addiction is discussed. The usefulness of behavioral therapy is discussed and suggested.  The correct use, cost and  side effects of nicotine replacement therapy such as gum or patches is discussed. Bupropion and its cost (sometimes not covered fully by insurance) and side effects are reviewed. The quit rates are discussed. I recommend she not allow potential costs of treatment to deter her from using nicotine replacement therapy or bupropion, as the long term economic and health benefits are obvious.       4. Vaginal candida  Assessment & Plan:  Diflucan 150 mg take one today and repeat in 3 days for yeast infection.     Orders:  -     fluconazole (DIFLUCAN) 150 MG Tab; Take 1 tablet (150 mg total) by mouth once daily. Take one today and repeat in 3 days for yeast infection for 2 days  Dispense: 2 tablet; Refill: 0         Future Appointments   Date Time Provider Department Center   2/3/2025  8:20 AM Criselda Garcia DNP [facility]       Follow up in about 4 weeks (around 10/8/2024). Call sooner if needed.    Criselda aGrcia DNP

## 2024-09-10 NOTE — ASSESSMENT & PLAN NOTE
Zanaflex 4 mg bid prn muscle spasm. Toradol. Neurontin 100 mg tid prn nerve pain. Relafen 500 bid prn, chiropractor.

## 2024-09-10 NOTE — ASSESSMENT & PLAN NOTE
Doxycycline 100 mg bid for 7 days for infection. Trelegy 200 mg daily albuterol tid prn. It is very important that she quit smoking. There are various alternatives available to help with this difficult task, but first and foremost, she must make a firm commitment and decision to quit. The nature of nicotine addiction is discussed. The usefulness of behavioral therapy is discussed and suggested.  The correct use, cost and side effects of nicotine replacement therapy such as gum or patches is discussed. Bupropion and its cost (sometimes not covered fully by insurance) and side effects are reviewed. The quit rates are discussed. I recommend she not allow potential costs of treatment to deter her from using nicotine replacement therapy or bupropion, as the long term economic and health benefits are obvious.

## 2024-09-11 ENCOUNTER — PATIENT MESSAGE (OUTPATIENT)
Dept: FAMILY MEDICINE | Facility: CLINIC | Age: 54
End: 2024-09-11
Payer: COMMERCIAL

## 2024-09-12 DIAGNOSIS — J44.89 EXACERBATION OF CHRONIC BRONCHIOLITIS: Primary | ICD-10-CM

## 2024-09-12 RX ORDER — DOXYCYCLINE 100 MG/1
100 CAPSULE ORAL 2 TIMES DAILY
Qty: 14 CAPSULE | Refills: 0 | Status: SHIPPED | OUTPATIENT
Start: 2024-09-12 | End: 2024-09-19

## 2024-09-12 RX ORDER — DOXYCYCLINE 100 MG/1
100 CAPSULE ORAL 2 TIMES DAILY
COMMUNITY
End: 2024-09-12 | Stop reason: SDUPTHER

## 2024-09-17 ENCOUNTER — OFFICE VISIT (OUTPATIENT)
Dept: FAMILY MEDICINE | Facility: CLINIC | Age: 54
End: 2024-09-17
Payer: COMMERCIAL

## 2024-09-17 VITALS
BODY MASS INDEX: 30.05 KG/M2 | TEMPERATURE: 98 F | DIASTOLIC BLOOD PRESSURE: 68 MMHG | RESPIRATION RATE: 20 BRPM | SYSTOLIC BLOOD PRESSURE: 132 MMHG | OXYGEN SATURATION: 98 % | HEIGHT: 64 IN | HEART RATE: 60 BPM | WEIGHT: 176 LBS

## 2024-09-17 DIAGNOSIS — J44.1 COPD WITH ACUTE EXACERBATION: ICD-10-CM

## 2024-09-17 DIAGNOSIS — J01.01 ACUTE RECURRENT MAXILLARY SINUSITIS: Primary | ICD-10-CM

## 2024-09-17 PROCEDURE — 99213 OFFICE O/P EST LOW 20 MIN: CPT | Mod: ,,, | Performed by: NURSE PRACTITIONER

## 2024-09-17 PROCEDURE — 3008F BODY MASS INDEX DOCD: CPT | Mod: CPTII,,, | Performed by: NURSE PRACTITIONER

## 2024-09-17 PROCEDURE — 1160F RVW MEDS BY RX/DR IN RCRD: CPT | Mod: CPTII,,, | Performed by: NURSE PRACTITIONER

## 2024-09-17 PROCEDURE — 3075F SYST BP GE 130 - 139MM HG: CPT | Mod: CPTII,,, | Performed by: NURSE PRACTITIONER

## 2024-09-17 PROCEDURE — 1159F MED LIST DOCD IN RCRD: CPT | Mod: CPTII,,, | Performed by: NURSE PRACTITIONER

## 2024-09-17 PROCEDURE — 3044F HG A1C LEVEL LT 7.0%: CPT | Mod: CPTII,,, | Performed by: NURSE PRACTITIONER

## 2024-09-17 PROCEDURE — 3078F DIAST BP <80 MM HG: CPT | Mod: CPTII,,, | Performed by: NURSE PRACTITIONER

## 2024-09-17 RX ORDER — METHYLPREDNISOLONE 4 MG/1
TABLET ORAL
Qty: 21 EACH | Refills: 0 | Status: SHIPPED | OUTPATIENT
Start: 2024-09-17 | End: 2024-10-08

## 2024-09-17 RX ORDER — PROMETHAZINE HYDROCHLORIDE AND CODEINE PHOSPHATE 6.25; 1 MG/5ML; MG/5ML
5 SOLUTION ORAL EVERY 8 HOURS PRN
Qty: 90 ML | Refills: 0 | Status: SHIPPED | OUTPATIENT
Start: 2024-09-17

## 2024-09-17 RX ORDER — AZELASTINE 1 MG/ML
1 SPRAY, METERED NASAL 2 TIMES DAILY
Qty: 30 ML | Refills: 0 | Status: SHIPPED | OUTPATIENT
Start: 2024-09-17 | End: 2025-09-17

## 2024-09-17 RX ORDER — LEVOFLOXACIN 750 MG/1
750 TABLET ORAL DAILY
Qty: 7 TABLET | Refills: 0 | Status: SHIPPED | OUTPATIENT
Start: 2024-09-17

## 2024-09-17 NOTE — ASSESSMENT & PLAN NOTE
Stop doxycycline and change to levoquin 750 mg for 7 days. Medrol dose pack. Promethasazine with codiene 5 ml tid prn cough (90 ml) trelegy 1 puff daily.

## 2024-09-17 NOTE — ASSESSMENT & PLAN NOTE
Levoquin 750 mg x 7 days stop vibramycin. Continue with nasal irrigation. Medrol dose pack. Astelin nasal spray bid after nasal irrigation.

## 2024-09-17 NOTE — PROGRESS NOTES
Patient ID: Mary Salguero  : 1970     Chief Complaint: feeling worse  Allergies: Patient is allergic to sudafed sinus and cold.     History of Present Illness:  The patient is a 54 y.o. White female who presents to clinic for evaluation and management with a chief complaint of feeling worse   HPI   Patient in clinic with complaints of feeling worse. Patient came in with runny nose, cough and congestion on 9/10. She was given inhalers and doxy. Patient has noticed still with significant maxillary congestion and pressure with cough. Still with cough and wheeze but found great worsening post nasal drip. Patient states that cough is worse at night. Smoking down to 1/2 pack and beginning to consider smoking cessation.     Past Medical History:  has a past medical history of Allergy and Hypertension.    Social History:  reports that she has been smoking cigarettes. She has never used smokeless tobacco. She reports that she does not drink alcohol and does not use drugs.    Care Team: Patient Care Team:  Criselda Garcia DNP as PCP - General (Family Medicine)     Current Medications:  Current Outpatient Medications   Medication Instructions    albuterol (VENTOLIN HFA) 90 mcg/actuation inhaler 2 puffs, Inhalation, Every 6 hours PRN, Rescue    atorvastatin (LIPITOR) 80 mg, Oral, Nightly    azelastine (ASTELIN) 137 mcg, Nasal, 2 times daily    busPIRone (BUSPAR) 10 mg, Oral, 3 times daily    citalopram (CELEXA) 40 mg, Oral, Daily    diclofenac (VOLTAREN) 50 mg, Oral, 2 times daily    fluticasone-umeclidin-vilanter (TRELEGY ELLIPTA) 200-62.5-25 mcg inhaler 1 puff, Inhalation, Daily    gabapentin (NEURONTIN) 100 mg, Oral, 3 times daily    hydrOXYzine HCL (ATARAX) 25 mg, Oral, Nightly PRN    levoFLOXacin (LEVAQUIN) 750 mg, Oral, Daily    methocarbamoL (ROBAXIN) 500 mg, Oral, Every 8 hours    methylPREDNISolone (MEDROL DOSEPACK) 4 mg tablet use as directed    metoprolol succinate (TOPROL-XL) 25 mg, Oral, Daily     "montelukast (SINGULAIR) 10 mg, Oral, Nightly    nabumetone (RELAFEN) 500 mg, Oral, 2 times daily PRN    promethazine-codeine 6.25-10 mg/5 ml (PHENERGAN WITH CODEINE) 6.25-10 mg/5 mL syrup 5 mLs, Oral, Every 8 hours PRN    tiZANidine (ZANAFLEX) 4 mg, Oral, Every 8 hours PRN       Review of Systems   Constitutional:  Positive for appetite change, chills, diaphoresis, fatigue and night sweats. Negative for fever.   HENT:  Positive for ear pain, rhinorrhea, sinus pressure/congestion and sneezing. Negative for facial swelling.    Respiratory:  Positive for cough and wheezing.    Cardiovascular: Negative.    Gastrointestinal: Negative.    Genitourinary: Negative.    Neurological:  Positive for headaches. Negative for dizziness, facial asymmetry and light-headedness.   Hematological: Negative.    Psychiatric/Behavioral:  Positive for sleep disturbance.         Visit Vitals  /68 (BP Location: Left arm, Patient Position: Sitting)   Pulse 60   Temp 98 °F (36.7 °C)   Resp 20   Ht 5' 4" (1.626 m)   Wt 79.8 kg (176 lb)   SpO2 98%   BMI 30.21 kg/m²       Physical Exam  Vitals and nursing note reviewed.   Constitutional:       General: She is not in acute distress.     Appearance: She is not ill-appearing.   HENT:      Head: Normocephalic and atraumatic.      Right Ear: A middle ear effusion is present.      Left Ear: A middle ear effusion is present.      Nose: Congestion and rhinorrhea present.      Right Turbinates: Enlarged and swollen.      Left Turbinates: Enlarged and swollen.      Left Sinus: Maxillary sinus tenderness present.      Mouth/Throat:      Mouth: Mucous membranes are moist.      Pharynx: Oropharynx is clear. Uvula midline. Posterior oropharyngeal erythema present. No oropharyngeal exudate.   Eyes:      General: No scleral icterus.     Extraocular Movements: Extraocular movements intact.      Conjunctiva/sclera: Conjunctivae normal.      Pupils: Pupils are equal, round, and reactive to light.   Neck:      " Vascular: No carotid bruit.   Cardiovascular:      Rate and Rhythm: Normal rate and regular rhythm.      Pulses: Normal pulses.      Heart sounds: Normal heart sounds. No murmur heard.     No friction rub. No gallop.   Pulmonary:      Effort: Pulmonary effort is normal. No respiratory distress.      Breath sounds: Wheezing and rhonchi present. No rales.   Abdominal:      General: Abdomen is flat. Bowel sounds are normal. There is no distension.      Palpations: Abdomen is soft. There is no mass.      Tenderness: There is no abdominal tenderness.   Musculoskeletal:         General: Normal range of motion.      Cervical back: Normal range of motion and neck supple.   Skin:     General: Skin is warm and dry.   Neurological:      General: No focal deficit present.      Mental Status: She is alert.   Psychiatric:         Mood and Affect: Mood normal.          Labs Reviewed:  Chemistry:  Lab Results   Component Value Date     08/19/2024    K 3.9 08/19/2024    BUN 8 08/19/2024    CREATININE 1.11 (H) 08/19/2024    EGFRNORACEVR 58 (L) 08/19/2024    GLUCOSE 120 (H) 01/03/2022    CALCIUM 9.2 08/19/2024    ALKPHOS 106 01/03/2022    LABPROT 6.0 01/03/2022    ALBUMIN 4.1 08/19/2024    AST 21 08/19/2024    ALT 18 08/19/2024    [id number] 30.3 02/12/2024    TSH 1.060 02/12/2024        Lab Results   Component Value Date    HGBA1C 5.8 (H) 08/19/2024        Hematology:  Lab Results   Component Value Date    WBC 7.3 08/19/2024    RBC 5.45 (H) 08/19/2024    HGB 15.9 08/19/2024    HCT 47.9 (H) 08/19/2024    MCV 88 08/19/2024    MCH 29.2 08/19/2024    MCHC 33.2 08/19/2024    RDW 12.5 08/19/2024     08/19/2024    MONO 7 08/19/2024    MONO 0.5 08/19/2024    BASO 0.1 08/19/2024    EOSINOPHIL 2 08/19/2024    BASOPHIL 2 08/19/2024       Lipid Panel:  Lab Results   Component Value Date    CHOL 114 08/19/2024    HDL 40 08/19/2024    LDLCALC 51 08/19/2024    TRIG 127 08/19/2024    TOTALCHOLEST 3.8 01/03/2022        Assessment &  Plan:  1. Acute recurrent maxillary sinusitis  Assessment & Plan:  Levoquin 750 mg x 7 days stop vibramycin. Continue with nasal irrigation. Medrol dose pack. Astelin nasal spray bid after nasal irrigation.     Orders:  -     levoFLOXacin (LEVAQUIN) 750 MG tablet; Take 1 tablet (750 mg total) by mouth once daily.  Dispense: 7 tablet; Refill: 0  -     azelastine (ASTELIN) 137 mcg (0.1 %) nasal spray; 1 spray (137 mcg total) by Nasal route 2 (two) times daily.  Dispense: 30 mL; Refill: 0    2. COPD with acute exacerbation  Assessment & Plan:  Stop doxycycline and change to levoquin 750 mg for 7 days. Medrol dose pack. Promethasazine with codiene 5 ml tid prn cough (90 ml) trelegy 1 puff daily.     Orders:  -     methylPREDNISolone (MEDROL DOSEPACK) 4 mg tablet; use as directed  Dispense: 21 each; Refill: 0  -     promethazine-codeine 6.25-10 mg/5 ml (PHENERGAN WITH CODEINE) 6.25-10 mg/5 mL syrup; Take 5 mLs by mouth every 8 (eight) hours as needed for Cough.  Dispense: 90 mL; Refill: 0         Future Appointments   Date Time Provider Department Center   9/17/2024 10:20 AM Crisleda Garcia DNP [facility] [place]   10/8/2024 10:00 AM Criselda Garcia DNP [facility] [place]   2/3/2025  8:20 AM Criselda Garcia DNP [facility] [place]       Follow up in about 4 weeks (around 10/15/2024). Call sooner if needed.    Criselda Garcia DNP

## 2024-09-18 ENCOUNTER — PATIENT MESSAGE (OUTPATIENT)
Dept: FAMILY MEDICINE | Facility: CLINIC | Age: 54
End: 2024-09-18
Payer: COMMERCIAL

## 2024-10-08 ENCOUNTER — OFFICE VISIT (OUTPATIENT)
Dept: FAMILY MEDICINE | Facility: CLINIC | Age: 54
End: 2024-10-08
Payer: COMMERCIAL

## 2024-10-08 VITALS
DIASTOLIC BLOOD PRESSURE: 68 MMHG | TEMPERATURE: 98 F | BODY MASS INDEX: 30.05 KG/M2 | HEART RATE: 60 BPM | OXYGEN SATURATION: 96 % | WEIGHT: 176 LBS | SYSTOLIC BLOOD PRESSURE: 121 MMHG | HEIGHT: 64 IN | RESPIRATION RATE: 20 BRPM

## 2024-10-08 DIAGNOSIS — Z72.0 CURRENT NICOTINE USE: ICD-10-CM

## 2024-10-08 DIAGNOSIS — I10 PRIMARY HYPERTENSION: ICD-10-CM

## 2024-10-08 DIAGNOSIS — J41.0 SIMPLE CHRONIC BRONCHITIS: Primary | ICD-10-CM

## 2024-10-08 DIAGNOSIS — J30.2 SEASONAL ALLERGIC RHINITIS, UNSPECIFIED TRIGGER: ICD-10-CM

## 2024-10-08 PROBLEM — J01.01 ACUTE RECURRENT MAXILLARY SINUSITIS: Status: RESOLVED | Noted: 2024-09-17 | Resolved: 2024-10-08

## 2024-10-08 PROCEDURE — 1160F RVW MEDS BY RX/DR IN RCRD: CPT | Mod: CPTII,,, | Performed by: NURSE PRACTITIONER

## 2024-10-08 PROCEDURE — 1159F MED LIST DOCD IN RCRD: CPT | Mod: CPTII,,, | Performed by: NURSE PRACTITIONER

## 2024-10-08 PROCEDURE — 3044F HG A1C LEVEL LT 7.0%: CPT | Mod: CPTII,,, | Performed by: NURSE PRACTITIONER

## 2024-10-08 PROCEDURE — 3078F DIAST BP <80 MM HG: CPT | Mod: CPTII,,, | Performed by: NURSE PRACTITIONER

## 2024-10-08 PROCEDURE — 99214 OFFICE O/P EST MOD 30 MIN: CPT | Mod: ,,, | Performed by: NURSE PRACTITIONER

## 2024-10-08 PROCEDURE — 3074F SYST BP LT 130 MM HG: CPT | Mod: CPTII,,, | Performed by: NURSE PRACTITIONER

## 2024-10-08 PROCEDURE — 3008F BODY MASS INDEX DOCD: CPT | Mod: CPTII,,, | Performed by: NURSE PRACTITIONER

## 2024-10-08 NOTE — ASSESSMENT & PLAN NOTE
Atrovent nasal spray helping and will notify when ready for refill. Helping sinus drip and cough.

## 2024-10-08 NOTE — ASSESSMENT & PLAN NOTE
Completed levoquin, taking singulair, continue trelegy 200/62.5/25 daily and ventolin prn bid. Lungs more clear. Need to quit smoking.    none

## 2024-10-08 NOTE — ASSESSMENT & PLAN NOTE
It is very important that she quit smoking. There are various alternatives available to help with this difficult task, but first and foremost, she must make a firm commitment and decision to quit. The nature of nicotine addiction is discussed. The usefulness of behavioral therapy is discussed and suggested.  The correct use, cost and side effects of nicotine replacement therapy such as gum or patches is discussed. Bupropion and its cost (sometimes not covered fully by insurance) and side effects are reviewed. The quit rates are discussed. I recommend she not allow potential costs of treatment to deter her from using nicotine replacement therapy or bupropion, as the long term economic and health benefits are obvious. Counseling 5 minutes. Sample nicotine patches.

## 2024-10-08 NOTE — PROGRESS NOTES
Patient ID: Mary Salguero  : 1970     Chief Complaint: Follow-up (Bronchitis)    Allergies: Patient is allergic to sudafed sinus and cold.     History of Present Illness:  The patient is a 54 y.o. White female who presents to clinic for evaluation and management with a chief complaint of Follow-up (Bronchitis)   HPI   Patient in clinic with 4 week follow-up on bronchitis. Patient states that she was given levaquin, medrol dose pack, phenergan with codeine. Patient states that she finished antibiotics and steroid pack. States that she takes the cough syrup at night and does help. Tried atovent nose spray and helped congestion better. Feeling less congestion. No further fever. Smoking 1 1/2 pack cigarettes daily.     Past Medical History:  has a past medical history of Allergy and Hypertension.    Social History:  reports that she has been smoking cigarettes. She has never used smokeless tobacco. She reports that she does not drink alcohol and does not use drugs.    Care Team: Patient Care Team:  Criselda Garcia DNP as PCP - General (Family Medicine)     Current Medications:  Current Outpatient Medications   Medication Instructions    albuterol (VENTOLIN HFA) 90 mcg/actuation inhaler 2 puffs, Inhalation, Every 6 hours PRN, Rescue    atorvastatin (LIPITOR) 80 mg, Oral, Nightly    azelastine (ASTELIN) 137 mcg, Nasal, 2 times daily    busPIRone (BUSPAR) 10 mg, Oral, 3 times daily    citalopram (CELEXA) 40 mg, Oral, Daily    diclofenac (VOLTAREN) 50 mg, 2 times daily    fluticasone-umeclidin-vilanter (TRELEGY ELLIPTA) 200-62.5-25 mcg inhaler 1 puff, Inhalation, Daily    gabapentin (NEURONTIN) 100 mg, Oral, 3 times daily    hydrOXYzine HCL (ATARAX) 25 mg, Oral, Nightly PRN    methocarbamoL (ROBAXIN) 500 mg, Every 8 hours    metoprolol succinate (TOPROL-XL) 25 mg, Oral, Daily    montelukast (SINGULAIR) 10 mg, Oral, Nightly    nabumetone (RELAFEN) 500 mg, Oral, 2 times daily PRN    promethazine-codeine 6.25-10 mg/5  The patient's mother called and stated the patient is currently out of medication. The patient's mother spoke with the pharmacy and they stated there was no prescription on file so the prescription dated 2/17 was not picked up by the patient's mother. Please advise.    "ml (PHENERGAN WITH CODEINE) 6.25-10 mg/5 mL syrup 5 mLs, Oral, Every 8 hours PRN    tiZANidine (ZANAFLEX) 4 mg, Oral, Every 8 hours PRN       Review of Systems   HENT:  Positive for rhinorrhea and sinus pressure/congestion.    Respiratory:  Positive for cough and wheezing. Negative for choking, chest tightness and shortness of breath.    Cardiovascular: Negative.    Gastrointestinal: Negative.    Genitourinary: Negative.         Visit Vitals  /68 (BP Location: Left arm, Patient Position: Sitting)   Pulse 60   Temp 97.9 °F (36.6 °C)   Resp 20   Ht 5' 4" (1.626 m)   Wt 79.8 kg (176 lb)   SpO2 96%   BMI 30.21 kg/m²       Physical Exam  Constitutional:       Appearance: Normal appearance.   HENT:      Head: Normocephalic.      Nose: Rhinorrhea present.      Mouth/Throat:      Mouth: Mucous membranes are dry.   Eyes:      Extraocular Movements: Extraocular movements intact.      Conjunctiva/sclera: Conjunctivae normal.   Cardiovascular:      Rate and Rhythm: Normal rate and regular rhythm.      Pulses: Normal pulses.      Heart sounds: Normal heart sounds.   Pulmonary:      Effort: Pulmonary effort is normal.      Breath sounds: Normal breath sounds.   Abdominal:      General: Bowel sounds are normal.      Palpations: Abdomen is soft.   Musculoskeletal:         General: Normal range of motion.      Cervical back: Normal range of motion.   Skin:     General: Skin is warm and dry.      Capillary Refill: Capillary refill takes less than 2 seconds.   Neurological:      General: No focal deficit present.      Mental Status: She is alert and oriented to person, place, and time.   Psychiatric:         Mood and Affect: Mood normal.         Behavior: Behavior normal.          Labs Reviewed:  Chemistry:  Lab Results   Component Value Date     08/19/2024    K 3.9 08/19/2024    BUN 8 08/19/2024    CREATININE 1.11 (H) 08/19/2024    EGFRNORACEVR 58 (L) 08/19/2024    GLUCOSE 120 (H) 01/03/2022    CALCIUM 9.2 08/19/2024    " ALKPHOS 106 01/03/2022    LABPROT 6.0 01/03/2022    ALBUMIN 4.1 08/19/2024    AST 21 08/19/2024    ALT 18 08/19/2024    [id number] 30.3 02/12/2024    TSH 1.060 02/12/2024        Lab Results   Component Value Date    HGBA1C 5.8 (H) 08/19/2024        Hematology:  Lab Results   Component Value Date    WBC 7.3 08/19/2024    RBC 5.45 (H) 08/19/2024    HGB 15.9 08/19/2024    HCT 47.9 (H) 08/19/2024    MCV 88 08/19/2024    MCH 29.2 08/19/2024    MCHC 33.2 08/19/2024    RDW 12.5 08/19/2024     08/19/2024    MONO 7 08/19/2024    MONO 0.5 08/19/2024    BASO 0.1 08/19/2024    EOSINOPHIL 2 08/19/2024    BASOPHIL 2 08/19/2024       Lipid Panel:  Lab Results   Component Value Date    CHOL 114 08/19/2024    HDL 40 08/19/2024    LDLCALC 51 08/19/2024    TRIG 127 08/19/2024    TOTALCHOLEST 3.8 01/03/2022        Assessment & Plan:  1. Simple chronic bronchitis  Assessment & Plan:  Completed levoquin, taking singulair, continue trelegy 200/62.5/25 daily and ventolin prn bid. Lungs more clear. Need to quit smoking.       2. Seasonal allergic rhinitis, unspecified trigger  Assessment & Plan:  Atrovent nasal spray helping and will notify when ready for refill. Helping sinus drip and cough.       3. Primary hypertension  Assessment & Plan:  Toprol xl 25 mg daily. The current medical regimen is effective; continue present plan and medications       4. Current nicotine use  Assessment & Plan:  It is very important that she quit smoking. There are various alternatives available to help with this difficult task, but first and foremost, she must make a firm commitment and decision to quit. The nature of nicotine addiction is discussed. The usefulness of behavioral therapy is discussed and suggested.  The correct use, cost and side effects of nicotine replacement therapy such as gum or patches is discussed. Bupropion and its cost (sometimes not covered fully by insurance) and side effects are reviewed. The quit rates are discussed. I  recommend she not allow potential costs of treatment to deter her from using nicotine replacement therapy or bupropion, as the long term economic and health benefits are obvious. Counseling 5 minutes. Sample nicotine patches.            Future Appointments   Date Time Provider Department Center   2/3/2025  8:20 AM [facility]elda Garcia[facility] [place]       No follow-ups on file. Call sooner if needed.    Criselda Garcia DNP

## 2025-01-08 ENCOUNTER — OFFICE VISIT (OUTPATIENT)
Dept: FAMILY MEDICINE | Facility: CLINIC | Age: 55
End: 2025-01-08
Payer: COMMERCIAL

## 2025-01-08 VITALS
TEMPERATURE: 98 F | RESPIRATION RATE: 20 BRPM | SYSTOLIC BLOOD PRESSURE: 128 MMHG | BODY MASS INDEX: 30.05 KG/M2 | HEIGHT: 64 IN | DIASTOLIC BLOOD PRESSURE: 68 MMHG | OXYGEN SATURATION: 96 % | HEART RATE: 74 BPM | WEIGHT: 176 LBS

## 2025-01-08 DIAGNOSIS — F32.A ANXIETY AND DEPRESSION: ICD-10-CM

## 2025-01-08 DIAGNOSIS — E55.9 VITAMIN D DEFICIENCY: ICD-10-CM

## 2025-01-08 DIAGNOSIS — Z71.6 ENCOUNTER FOR SMOKING CESSATION COUNSELING: Primary | ICD-10-CM

## 2025-01-08 DIAGNOSIS — J42 ACUTE EXACERBATION OF CHRONIC BRONCHITIS: ICD-10-CM

## 2025-01-08 DIAGNOSIS — J41.0 SIMPLE CHRONIC BRONCHITIS: ICD-10-CM

## 2025-01-08 DIAGNOSIS — F41.9 ANXIETY AND DEPRESSION: ICD-10-CM

## 2025-01-08 DIAGNOSIS — I10 PRIMARY HYPERTENSION: ICD-10-CM

## 2025-01-08 DIAGNOSIS — J30.2 SEASONAL ALLERGIC RHINITIS, UNSPECIFIED TRIGGER: ICD-10-CM

## 2025-01-08 DIAGNOSIS — J20.9 ACUTE EXACERBATION OF CHRONIC BRONCHITIS: ICD-10-CM

## 2025-01-08 DIAGNOSIS — E78.5 HYPERLIPIDEMIA, UNSPECIFIED HYPERLIPIDEMIA TYPE: ICD-10-CM

## 2025-01-08 DIAGNOSIS — E78.5 HYPERLIPIDEMIA LDL GOAL <100: ICD-10-CM

## 2025-01-08 DIAGNOSIS — J32.9 SINUSITIS, UNSPECIFIED CHRONICITY, UNSPECIFIED LOCATION: ICD-10-CM

## 2025-01-08 DIAGNOSIS — J44.1 COPD WITH ACUTE EXACERBATION: ICD-10-CM

## 2025-01-08 DIAGNOSIS — R73.01 IMPAIRED FASTING GLUCOSE: ICD-10-CM

## 2025-01-08 PROCEDURE — 1159F MED LIST DOCD IN RCRD: CPT | Mod: CPTII,,, | Performed by: NURSE PRACTITIONER

## 2025-01-08 PROCEDURE — 96372 THER/PROPH/DIAG INJ SC/IM: CPT | Mod: ,,, | Performed by: NURSE PRACTITIONER

## 2025-01-08 PROCEDURE — 3074F SYST BP LT 130 MM HG: CPT | Mod: CPTII,,, | Performed by: NURSE PRACTITIONER

## 2025-01-08 PROCEDURE — 3078F DIAST BP <80 MM HG: CPT | Mod: CPTII,,, | Performed by: NURSE PRACTITIONER

## 2025-01-08 PROCEDURE — 3008F BODY MASS INDEX DOCD: CPT | Mod: CPTII,,, | Performed by: NURSE PRACTITIONER

## 2025-01-08 PROCEDURE — 99214 OFFICE O/P EST MOD 30 MIN: CPT | Mod: 25,,, | Performed by: NURSE PRACTITIONER

## 2025-01-08 PROCEDURE — 1160F RVW MEDS BY RX/DR IN RCRD: CPT | Mod: CPTII,,, | Performed by: NURSE PRACTITIONER

## 2025-01-08 RX ORDER — CITALOPRAM 40 MG/1
40 TABLET, FILM COATED ORAL DAILY
Qty: 90 TABLET | Refills: 1 | Status: SHIPPED | OUTPATIENT
Start: 2025-01-08

## 2025-01-08 RX ORDER — VARENICLINE TARTRATE 1 MG/1
1 TABLET, FILM COATED ORAL 2 TIMES DAILY
Qty: 180 TABLET | Refills: 1 | Status: SHIPPED | OUTPATIENT
Start: 2025-01-08

## 2025-01-08 RX ORDER — HYDROXYZINE HYDROCHLORIDE 25 MG/1
25 TABLET, FILM COATED ORAL NIGHTLY PRN
Qty: 90 TABLET | Refills: 1 | Status: SHIPPED | OUTPATIENT
Start: 2025-01-08

## 2025-01-08 RX ORDER — MONTELUKAST SODIUM 10 MG/1
10 TABLET ORAL NIGHTLY
Qty: 90 TABLET | Refills: 1 | Status: SHIPPED | OUTPATIENT
Start: 2025-01-08

## 2025-01-08 RX ORDER — PROMETHAZINE HYDROCHLORIDE AND CODEINE PHOSPHATE 6.25; 1 MG/5ML; MG/5ML
5 SOLUTION ORAL EVERY 8 HOURS PRN
Qty: 90 ML | Refills: 0 | Status: SHIPPED | OUTPATIENT
Start: 2025-01-08

## 2025-01-08 RX ORDER — DEXAMETHASONE SODIUM PHOSPHATE 10 MG/ML
10 INJECTION INTRAMUSCULAR; INTRAVENOUS
Status: COMPLETED | OUTPATIENT
Start: 2025-01-08 | End: 2025-01-08

## 2025-01-08 RX ORDER — BUDESONIDE, GLYCOPYRROLATE, AND FORMOTEROL FUMARATE 160; 9; 4.8 UG/1; UG/1; UG/1
1 AEROSOL, METERED RESPIRATORY (INHALATION) 2 TIMES DAILY
Qty: 5.9 G | Refills: 0 | COMMUNITY
Start: 2025-01-08

## 2025-01-08 RX ORDER — BUDESONIDE, GLYCOPYRROLATE, AND FORMOTEROL FUMARATE 160; 9; 4.8 UG/1; UG/1; UG/1
2 AEROSOL, METERED RESPIRATORY (INHALATION) 2 TIMES DAILY
Qty: 10.7 G | Refills: 2 | Status: SHIPPED | OUTPATIENT
Start: 2025-01-08

## 2025-01-08 RX ORDER — ATORVASTATIN CALCIUM 80 MG/1
80 TABLET, FILM COATED ORAL NIGHTLY
Qty: 90 TABLET | Refills: 1 | Status: SHIPPED | OUTPATIENT
Start: 2025-01-08

## 2025-01-08 RX ORDER — DOXYCYCLINE 100 MG/1
100 CAPSULE ORAL 2 TIMES DAILY
Qty: 20 CAPSULE | Refills: 0 | Status: SHIPPED | OUTPATIENT
Start: 2025-01-08 | End: 2025-01-18

## 2025-01-08 RX ORDER — BUSPIRONE HYDROCHLORIDE 10 MG/1
10 TABLET ORAL 3 TIMES DAILY
Qty: 90 TABLET | Refills: 11 | Status: SHIPPED | OUTPATIENT
Start: 2025-01-08 | End: 2026-01-08

## 2025-01-08 RX ORDER — METOPROLOL SUCCINATE 25 MG/1
25 TABLET, EXTENDED RELEASE ORAL DAILY
Qty: 90 TABLET | Refills: 1 | Status: SHIPPED | OUTPATIENT
Start: 2025-01-08

## 2025-01-08 RX ADMIN — DEXAMETHASONE SODIUM PHOSPHATE 10 MG: 10 INJECTION INTRAMUSCULAR; INTRAVENOUS at 08:01

## 2025-01-08 NOTE — ASSESSMENT & PLAN NOTE
Lipitor 80 mg qhs The current medical regimen is effective;  continue present plan and medications.

## 2025-01-08 NOTE — ASSESSMENT & PLAN NOTE
chantix during summer.  And did well with the Chantix for 2 months then restarted smoking now she is smoking more and bronchitis is getting worse.  She is ready to resume the Chantix 1 mg b.i.d. and work with the smoking cessation.  Can still use nicotine patch or lozenge or anything that helps her to quit smoking.

## 2025-01-08 NOTE — PROGRESS NOTES
Patient ID: Mary Salguero  : 1970     Chief Complaint: Follow-up (Smoking cessation)    Allergies: Patient is allergic to sudafed sinus and cold.     History of Present Illness:  The patient is a 54 y.o. White female who presents to clinic for evaluation and management with a chief complaint of Follow-up (Smoking cessation)   HPI   Patient in clinic with follow-up on smoking cessation. States that she continues to smoke same. Patient also complains of runny nose, congestion and cough. States that symptoms started New Years Day. States that she continues with albuterol 3 x daily but still feeling chest congestion and more shortness of breath.  Also took Nyquil. Patient states that he has been out of some of her medications due to  being out of a job for 2 months. Cough keeping awake all night.     Past Medical History:  has a past medical history of Allergy and Hypertension.    Social History:  reports that she has been smoking cigarettes. She has never used smokeless tobacco. She reports that she does not drink alcohol and does not use drugs.    Care Team: Patient Care Team:  Criselda Garcia DNP as PCP - General (Family Medicine)     Current Medications:  Current Outpatient Medications   Medication Instructions    albuterol (VENTOLIN HFA) 90 mcg/actuation inhaler 2 puffs, Inhalation, Every 6 hours PRN, Rescue    atorvastatin (LIPITOR) 80 mg, Oral, Nightly    azelastine (ASTELIN) 137 mcg, Nasal, 2 times daily    budesonide-glycopyr-formoterol (BREZTRI AEROSPHERE) 160-9-4.8 mcg/actuation HFAA 2 puffs, Inhalation, 2 times daily, Rinse mouth after use    busPIRone (BUSPAR) 10 mg, Oral, 3 times daily    citalopram (CELEXA) 40 mg, Oral, Daily    hydrOXYzine HCL (ATARAX) 25 mg, Oral, Nightly PRN    metoprolol succinate (TOPROL-XL) 25 mg, Oral, Daily    montelukast (SINGULAIR) 10 mg, Oral, Nightly    promethazine-codeine 6.25-10 mg/5 ml (PHENERGAN WITH CODEINE) 6.25-10 mg/5 mL syrup 5 mLs, Oral, Every 8  "hours PRN    varenicline (CHANTIX) 1 mg, Oral, 2 times daily       Review of Systems   Constitutional:  Positive for chills and fatigue. Negative for activity change, appetite change, fever, night sweats and unexpected weight change.   HENT:  Positive for rhinorrhea, sinus pressure/congestion and sneezing. Negative for ear discharge, ear pain and sore throat.    Eyes: Negative.    Respiratory:  Positive for cough, shortness of breath and wheezing. Negative for chest tightness and stridor.    Cardiovascular: Negative.    Gastrointestinal: Negative.    Endocrine: Negative.    Genitourinary: Negative.    Musculoskeletal: Negative.    Integumentary:  Negative.   Allergic/Immunologic: Positive for environmental allergies.   Neurological: Negative.    Hematological: Negative.    Psychiatric/Behavioral:  Positive for sleep disturbance. The patient is nervous/anxious (buspar helping symptoms).         Visit Vitals  /68 (BP Location: Left arm, Patient Position: Sitting)   Pulse 74   Temp 98.2 °F (36.8 °C)   Resp 20   Ht 5' 4" (1.626 m)   Wt 79.8 kg (176 lb)   SpO2 96%   BMI 30.21 kg/m²       Physical Exam  Vitals and nursing note reviewed.   Constitutional:       Appearance: Normal appearance.   HENT:      Head: Normocephalic.      Nose: Rhinorrhea present.      Mouth/Throat:      Mouth: Mucous membranes are dry.      Pharynx: Posterior oropharyngeal erythema present.   Eyes:      Extraocular Movements: Extraocular movements intact.      Conjunctiva/sclera: Conjunctivae normal.   Cardiovascular:      Rate and Rhythm: Normal rate and regular rhythm.      Pulses: Normal pulses.      Heart sounds: Normal heart sounds.   Pulmonary:      Effort: Pulmonary effort is normal. No respiratory distress.      Breath sounds: No stridor. Wheezing and rhonchi present. No rales.   Abdominal:      General: Bowel sounds are normal.      Palpations: Abdomen is soft.   Musculoskeletal:         General: Normal range of motion.      " Cervical back: Normal range of motion.   Skin:     General: Skin is warm and dry.      Capillary Refill: Capillary refill takes less than 2 seconds.   Neurological:      General: No focal deficit present.      Mental Status: She is alert and oriented to person, place, and time.   Psychiatric:         Mood and Affect: Mood normal.         Behavior: Behavior normal.          Labs Reviewed:  Chemistry:  Lab Results   Component Value Date     08/19/2024    K 3.9 08/19/2024    BUN 8 08/19/2024    CREATININE 1.11 (H) 08/19/2024    EGFRNORACEVR 58 (L) 08/19/2024    GLUCOSE 120 (H) 01/03/2022    CALCIUM 9.2 08/19/2024    ALKPHOS 106 01/03/2022    LABPROT 6.0 01/03/2022    ALBUMIN 4.1 08/19/2024    AST 21 08/19/2024    ALT 18 08/19/2024    [id number] 30.3 02/12/2024    TSH 1.060 02/12/2024        Lab Results   Component Value Date    HGBA1C 5.8 (H) 08/19/2024        Hematology:  Lab Results   Component Value Date    WBC 7.3 08/19/2024    RBC 5.45 (H) 08/19/2024    HGB 15.9 08/19/2024    HCT 47.9 (H) 08/19/2024    MCV 88 08/19/2024    MCH 29.2 08/19/2024    MCHC 33.2 08/19/2024    RDW 12.5 08/19/2024     08/19/2024    MONO 7 08/19/2024    MONO 0.5 08/19/2024    BASO 0.1 08/19/2024    EOSINOPHIL 2 08/19/2024    BASOPHIL 2 08/19/2024       Lipid Panel:  Lab Results   Component Value Date    CHOL 114 08/19/2024    HDL 40 08/19/2024    LDLCALC 51 08/19/2024    TRIG 127 08/19/2024    TOTALCHOLEST 3.8 01/03/2022        Assessment & Plan:  1. Encounter for smoking cessation counseling  Assessment & Plan:  chantix during summer.  And did well with the Chantix for 2 months then restarted smoking now she is smoking more and bronchitis is getting worse.  She is ready to resume the Chantix 1 mg b.i.d. and work with the smoking cessation.  Can still use nicotine patch or lozenge or anything that helps her to quit smoking.    Orders:  -     varenicline (CHANTIX) 1 mg Tab; Take 1 tablet (1 mg total) by mouth 2 (two) times  daily.  Dispense: 180 tablet; Refill: 1    2. Primary hypertension  Assessment & Plan:  Toprol xl 25 mg daily. The current medical regimen is effective; continue present plan and medications.    Orders:  -     metoprolol succinate (TOPROL-XL) 25 MG 24 hr tablet; Take 1 tablet (25 mg total) by mouth once daily.  Dispense: 90 tablet; Refill: 1    3. Simple chronic bronchitis  Assessment & Plan:  singulair, continue trelegy 200/62.5/25 daily and ventolin prn bid. Need to quit smoking.       4. Hyperlipidemia LDL goal <100  Assessment & Plan:  Lipitor 80 mg qhs The current medical regimen is effective;  continue present plan and medications.        5. Vitamin D deficiency  Assessment & Plan:  2000 daily and rechecking vitamin-D in six-month       6. Anxiety and depression  Assessment & Plan:  Celexa 40 mg daily. BuSpar 10 mg b.i.d. for anxiety and hydroxyzine 25 mg qhs for rest/anxiety. The current medical regimen is effective;  continue present plan and medications.     Orders:  -     busPIRone (BUSPAR) 10 MG tablet; Take 1 tablet (10 mg total) by mouth 3 (three) times daily.  Dispense: 90 tablet; Refill: 11  -     citalopram (CELEXA) 40 MG tablet; Take 1 tablet (40 mg total) by mouth once daily.  Dispense: 90 tablet; Refill: 1  -     hydrOXYzine HCL (ATARAX) 25 MG tablet; Take 1 tablet (25 mg total) by mouth nightly as needed for Anxiety.  Dispense: 90 tablet; Refill: 1    7. Acute exacerbation of chronic bronchitis  Assessment & Plan:  Doxycycline 100 mg q12h x 10 days. Brestri 2 puffs bid, dexamethasone.  Albuterol HFA 2 puffs q8h prn wheeze. Quit smoking. Mucinex i annex in then am phenergan with codeine. Call any failure to improve or worsening.  Recheck betterlung 6 week.    Orders:  -     budesonide-glycopyr-formoterol (BREZTRI AEROSPHERE) 160-9-4.8 mcg/actuation HFAA; Inhale 2 puffs into the lungs 2 (two) times daily. Rinse mouth after use  Dispense: 10.7 g; Refill: 2  -     promethazine-codeine 6.25-10 mg/5 ml  (PHENERGAN WITH CODEINE) 6.25-10 mg/5 mL syrup; Take 5 mLs by mouth every 8 (eight) hours as needed for Cough.  Dispense: 90 mL; Refill: 0    8. COPD with acute exacerbation  -     promethazine-codeine 6.25-10 mg/5 ml (PHENERGAN WITH CODEINE) 6.25-10 mg/5 mL syrup; Take 5 mLs by mouth every 8 (eight) hours as needed for Cough.  Dispense: 90 mL; Refill: 0    9. Hyperlipidemia, unspecified hyperlipidemia type  -     atorvastatin (LIPITOR) 80 MG tablet; Take 1 tablet (80 mg total) by mouth every evening.  Dispense: 90 tablet; Refill: 1    10. Seasonal allergic rhinitis, unspecified trigger  -     hydrOXYzine HCL (ATARAX) 25 MG tablet; Take 1 tablet (25 mg total) by mouth nightly as needed for Anxiety.  Dispense: 90 tablet; Refill: 1  -     montelukast (SINGULAIR) 10 mg tablet; Take 1 tablet (10 mg total) by mouth every evening.  Dispense: 90 tablet; Refill: 1    11. Sinusitis, unspecified chronicity, unspecified location  -     montelukast (SINGULAIR) 10 mg tablet; Take 1 tablet (10 mg total) by mouth every evening.  Dispense: 90 tablet; Refill: 1         Future Appointments   Date Time Provider Department Center   2/3/2025  8:20 AM Criselda Garcia DNP [facility]       Follow up in about 6 weeks (around 2/19/2025). Call sooner if needed.    Criselda Garcia DNP

## 2025-01-08 NOTE — ASSESSMENT & PLAN NOTE
Doxycycline 100 mg q12h x 10 days. Brestri 2 puffs bid, dexamethasone.  Albuterol HFA 2 puffs q8h prn wheeze. Quit smoking. Mucinex i annex in then am phenergan with codeine. Call any failure to improve or worsening.  Recheck betterlung 6 week.

## 2025-01-09 ENCOUNTER — TELEPHONE (OUTPATIENT)
Dept: FAMILY MEDICINE | Facility: CLINIC | Age: 55
End: 2025-01-09
Payer: COMMERCIAL

## 2025-01-09 LAB
ALBUMIN SERPL-MCNC: 4.1 G/DL (ref 3.8–4.9)
ALP SERPL-CCNC: 104 IU/L (ref 44–121)
ALT SERPL-CCNC: 15 IU/L (ref 0–32)
AST SERPL-CCNC: 14 IU/L (ref 0–40)
BILIRUB SERPL-MCNC: 0.4 MG/DL (ref 0–1.2)
BUN SERPL-MCNC: 10 MG/DL (ref 6–24)
BUN/CREAT SERPL: 10 (ref 9–23)
CALCIUM SERPL-MCNC: 9.4 MG/DL (ref 8.7–10.2)
CHLORIDE SERPL-SCNC: 97 MMOL/L (ref 96–106)
CHOLEST SERPL-MCNC: 235 MG/DL (ref 100–199)
CO2 SERPL-SCNC: 23 MMOL/L (ref 20–29)
CREAT SERPL-MCNC: 1.01 MG/DL (ref 0.57–1)
EST. GFR  (NO RACE VARIABLE): 65 ML/MIN/1.73
GLOBULIN SER CALC-MCNC: 2.3 G/DL (ref 1.5–4.5)
GLUCOSE SERPL-MCNC: 126 MG/DL (ref 70–99)
HBA1C MFR BLD: 5.5 % (ref 4.8–5.6)
HDLC SERPL-MCNC: 33 MG/DL
LDLC SERPL CALC-MCNC: 164 MG/DL (ref 0–99)
POTASSIUM SERPL-SCNC: 4 MMOL/L (ref 3.5–5.2)
PROT SERPL-MCNC: 6.4 G/DL (ref 6–8.5)
SODIUM SERPL-SCNC: 134 MMOL/L (ref 134–144)
TRIGL SERPL-MCNC: 205 MG/DL (ref 0–149)
VLDLC SERPL CALC-MCNC: 38 MG/DL (ref 5–40)

## 2025-01-09 NOTE — TELEPHONE ENCOUNTER
----- Message from Criselda Garcia DNP sent at 1/9/2025 11:13 AM CST -----  Notify patient glucose fasting is higher but otherwise stable kidney and liver function. Cholesterol much worse after off statin for 2 months. Ensure she continues newly renewed atorvastatin 80 with low fat diet. Ask if she's beginning to feel any bronchitis exacerbation symptom relief yet? Recheck wellness with vitamin D, tsh, cmp, flp, vitamin D, hgb A1c (impaired fasting glucose) in 6 months.

## 2025-02-03 PROBLEM — Z00.01 ENCOUNTER FOR PREVENTATIVE ADULT HEALTH CARE EXAM WITH ABNORMAL FINDINGS: Status: ACTIVE | Noted: 2025-02-03

## 2025-02-03 PROBLEM — Z12.11 COLON CANCER SCREENING: Status: ACTIVE | Noted: 2025-02-03

## 2025-02-19 ENCOUNTER — OFFICE VISIT (OUTPATIENT)
Dept: FAMILY MEDICINE | Facility: CLINIC | Age: 55
End: 2025-02-19
Payer: COMMERCIAL

## 2025-02-19 VITALS
HEART RATE: 93 BPM | TEMPERATURE: 97 F | OXYGEN SATURATION: 97 % | BODY MASS INDEX: 30.73 KG/M2 | SYSTOLIC BLOOD PRESSURE: 169 MMHG | RESPIRATION RATE: 20 BRPM | DIASTOLIC BLOOD PRESSURE: 82 MMHG | WEIGHT: 180 LBS | HEIGHT: 64 IN

## 2025-02-19 DIAGNOSIS — F32.A ANXIETY AND DEPRESSION: ICD-10-CM

## 2025-02-19 DIAGNOSIS — I10 PRIMARY HYPERTENSION: Primary | ICD-10-CM

## 2025-02-19 DIAGNOSIS — J41.0 SIMPLE CHRONIC BRONCHITIS: ICD-10-CM

## 2025-02-19 DIAGNOSIS — F41.9 ANXIETY AND DEPRESSION: ICD-10-CM

## 2025-02-19 DIAGNOSIS — G47.33 OBSTRUCTIVE SLEEP APNEA: ICD-10-CM

## 2025-02-19 DIAGNOSIS — Z91.148 NON COMPLIANCE W MEDICATION REGIMEN: ICD-10-CM

## 2025-02-19 DIAGNOSIS — Z72.0 CURRENT NICOTINE USE: ICD-10-CM

## 2025-02-19 DIAGNOSIS — E78.5 HYPERLIPIDEMIA LDL GOAL <100: ICD-10-CM

## 2025-02-19 PROBLEM — J42 ACUTE EXACERBATION OF CHRONIC BRONCHITIS: Status: RESOLVED | Noted: 2025-01-08 | Resolved: 2025-02-19

## 2025-02-19 PROBLEM — J20.9 ACUTE EXACERBATION OF CHRONIC BRONCHITIS: Status: RESOLVED | Noted: 2025-01-08 | Resolved: 2025-02-19

## 2025-02-19 RX ORDER — BUSPIRONE HYDROCHLORIDE 15 MG/1
15 TABLET ORAL 2 TIMES DAILY
Qty: 60 TABLET | Refills: 1 | Status: SHIPPED | OUTPATIENT
Start: 2025-02-19

## 2025-02-19 RX ORDER — ALBUTEROL SULFATE 90 UG/1
2 INHALANT RESPIRATORY (INHALATION) EVERY 6 HOURS PRN
Qty: 18 G | Refills: 2 | Status: SHIPPED | OUTPATIENT
Start: 2025-02-19

## 2025-02-19 RX ORDER — BUDESONIDE, GLYCOPYRROLATE, AND FORMOTEROL FUMARATE 160; 9; 4.8 UG/1; UG/1; UG/1
1 AEROSOL, METERED RESPIRATORY (INHALATION) 2 TIMES DAILY
Qty: 5.9 G | Refills: 0 | COMMUNITY
Start: 2025-02-19 | End: 2025-02-19

## 2025-02-19 RX ORDER — BUDESONIDE, GLYCOPYRROLATE, AND FORMOTEROL FUMARATE 160; 9; 4.8 UG/1; UG/1; UG/1
1 AEROSOL, METERED RESPIRATORY (INHALATION) 2 TIMES DAILY
Qty: 5.9 G | Refills: 0 | Status: SHIPPED | OUTPATIENT
Start: 2025-02-19

## 2025-02-19 NOTE — ASSESSMENT & PLAN NOTE
Lipitor 80 mg qhs The current medical regimen is effective;  continue present plan and medications

## 2025-02-19 NOTE — ASSESSMENT & PLAN NOTE
It is very important that she quit smoking. There are various alternatives available to help with this difficult task, but first and foremost, she must make a firm commitment and decision to quit. The nature of nicotine addiction is discussed. The usefulness of behavioral therapy is discussed and suggested. The correct use, cost and side effects of nicotine replacement therapy such as gum or patches is discussed. Bupropion and its cost (sometimes not covered fully by insurance) and side effects are reviewed.

## 2025-02-19 NOTE — PROGRESS NOTES
Patient ID: Mary Salguero  : 1970     Chief Complaint: Follow-up (Recheck lungs, blood pressure elevated, states that she was getting rushed to come)    Allergies: Patient is allergic to sudafed sinus and cold.     History of Present Illness:  The patient is a 55 y.o. White female who presents to clinic for evaluation and management with a chief complaint of Follow-up (Recheck lungs, blood pressure elevated, states that she was getting rushed to come)   History of Present Illness    CHIEF COMPLAINT:  Patient presents today for follow up    MEDICATION COMPLIANCE:  She reports inconsistent adherence to prescribed medications. She occasionally skips metoprolol doses, typically missing one day before resuming the following day. She experiences racing heart and palpitations when metoprolol is missed, particularly at night. She has not been using inhalers as prescribed and has been without Zyrtec for approximately one week. She recently refilled all other prescriptions.    ANXIETY:  She experiences significant anxiety symptoms affecting her nerves and abdomen. She continues Buspar twice daily, hydroxyzine, and Celexa for management.    RESPIRATORY:  Her bronchitis symptoms have improved with antibiotic therapy.  She reports that she is having some cough and does become somewhat short of breath especially when supine position smoking remaining at for a day and not willing to increase efforts to stop smoking at this time.    CURRENT MEDICATIONS:  She recently restarted methotrexate and continues Singulair.    SOCIAL HISTORY:  She currently smokes more than one pack of cigarettes daily, with consumption extending into a second pack.      ROS:  General: -fever, -chills, -fatigue, -weight gain, -weight loss  Eyes: -vision changes, -redness, -discharge  ENT: -ear pain, -nasal congestion, -sore throat  Cardiovascular: -chest pain, +palpitations, -lower extremity edema  Respiratory: -cough, -shortness of  breath  Gastrointestinal: -abdominal pain, -nausea, -vomiting, -diarrhea, -constipation, -blood in stool  Genitourinary: -dysuria, -hematuria, -frequency  Musculoskeletal: -joint pain, -muscle pain  Skin: -rash, -lesion  Neurological: -headache, -dizziness, -numbness, -tingling  Psychiatric: +anxiety, -depression, -sleep difficulty          Past Medical History:  has a past medical history of Allergy and Hypertension.    Social History:  reports that she has been smoking cigarettes. She has never used smokeless tobacco. She reports that she does not drink alcohol and does not use drugs.    Care Team: Patient Care Team:  Criselda Garcia DNP as PCP - General (Family Medicine)     Current Medications:  Current Outpatient Medications   Medication Instructions    albuterol (VENTOLIN HFA) 90 mcg/actuation inhaler 2 puffs, Inhalation, Every 6 hours PRN, Rescue    atorvastatin (LIPITOR) 80 mg, Oral, Nightly    azelastine (ASTELIN) 137 mcg, Nasal, 2 times daily    budesonide-glycopyr-formoterol (BREZTRI AEROSPHERE) 160-9-4.8 mcg/actuation HFAA 1 puff, Inhalation, 2 times daily    busPIRone (BUSPAR) 15 mg, Oral, 2 times daily, Dose increase for anxiety    citalopram (CELEXA) 40 mg, Oral, Daily    hydrOXYzine HCL (ATARAX) 25 mg, Oral, Nightly PRN    metoprolol succinate (TOPROL-XL) 25 mg, Oral, Daily    montelukast (SINGULAIR) 10 mg, Oral, Nightly    varenicline tartrate (CHANTIX) 1 mg, Oral, 2 times daily       Review of Systems   Constitutional:  Positive for fatigue.   HENT:  Positive for postnasal drip.    Respiratory:  Positive for cough. Negative for shortness of breath and wheezing.    Cardiovascular: Negative.    Gastrointestinal: Negative.    Musculoskeletal: Negative.    Integumentary:  Negative.   Psychiatric/Behavioral:  Positive for sleep disturbance. Negative for agitation, behavioral problems, dysphoric mood and hallucinations. The patient is nervous/anxious.    All other systems reviewed and are negative.    "    Visit Vitals  BP (!) 169/82 (BP Location: Left arm, Patient Position: Sitting)   Pulse 93   Temp 97.3 °F (36.3 °C)   Resp 20   Ht 5' 4" (1.626 m)   Wt 81.6 kg (180 lb)   SpO2 97%   BMI 30.90 kg/m²       Physical Exam  Constitutional:       Appearance: Normal appearance.   HENT:      Head: Normocephalic.      Nose: Nose normal.      Mouth/Throat:      Mouth: Mucous membranes are dry.   Eyes:      Extraocular Movements: Extraocular movements intact.      Conjunctiva/sclera: Conjunctivae normal.   Cardiovascular:      Rate and Rhythm: Normal rate and regular rhythm.      Pulses: Normal pulses.      Heart sounds: Normal heart sounds.   Pulmonary:      Effort: Pulmonary effort is normal.      Breath sounds: Normal breath sounds.   Abdominal:      General: Bowel sounds are normal.      Palpations: Abdomen is soft.   Musculoskeletal:         General: Normal range of motion.      Cervical back: Normal range of motion.   Skin:     General: Skin is warm and dry.      Capillary Refill: Capillary refill takes less than 2 seconds.   Neurological:      General: No focal deficit present.      Mental Status: She is alert and oriented to person, place, and time.   Psychiatric:         Mood and Affect: Mood normal.          Labs Reviewed:  Chemistry:  Lab Results   Component Value Date     01/07/2025    K 4.0 01/07/2025    BUN 10 01/07/2025    CREATININE 1.01 (H) 01/07/2025    EGFRNORACEVR 65 01/07/2025    GLUCOSE 120 (H) 01/03/2022    CALCIUM 9.4 01/07/2025    ALKPHOS 106 01/03/2022    LABPROT 6.0 01/03/2022    ALBUMIN 4.1 01/07/2025    AST 14 01/07/2025    ALT 15 01/07/2025    [id number] 30.3 02/12/2024    TSH 1.060 02/12/2024        Lab Results   Component Value Date    HGBA1C 5.5 01/07/2025        Hematology:  Lab Results   Component Value Date    WBC 7.3 08/19/2024    RBC 5.45 (H) 08/19/2024    HGB 15.9 08/19/2024    HCT 47.9 (H) 08/19/2024    MCV 88 08/19/2024    MCH 29.2 08/19/2024    MCHC 33.2 08/19/2024    RDW " 12.5 08/19/2024     08/19/2024    MONO 7 08/19/2024    MONO 0.5 08/19/2024    BASO 0.1 08/19/2024    EOSINOPHIL 2 08/19/2024    BASOPHIL 2 08/19/2024       Lipid Panel:  Lab Results   Component Value Date    CHOL 235 (H) 01/07/2025    HDL 33 (L) 01/07/2025    LDLCALC 164 (H) 01/07/2025    TRIG 205 (H) 01/07/2025    TOTALCHOLEST 3.8 01/03/2022        Assessment & Plan:  1. Primary hypertension  Assessment & Plan:  Toprol xl 25 mg daily. Skips dose rarely. Feeling more nocturnal palpitations, but skipped yesterday. Check blood pressure and pulse bid for next 2 weeks and call with results. If not <130/80 may adjust medication.       2. Anxiety and depression  -     busPIRone (BUSPAR) 15 MG tablet; Take 1 tablet (15 mg total) by mouth 2 (two) times daily. Dose increase for anxiety  Dispense: 60 tablet; Refill: 1    3. Simple chronic bronchitis  Assessment & Plan:  Improved since antibiotic but scattered rhonchi and wheeze, continue singulair, zyrtec, resume breztri and albuterol bid. Work to wean smoking 1/2 pack, chantix.     Orders:  -     albuterol (VENTOLIN HFA) 90 mcg/actuation inhaler; Inhale 2 puffs into the lungs every 6 (six) hours as needed for Wheezing. Rescue  Dispense: 18 g; Refill: 2  -     budesonide-glycopyr-formoterol (BREZTRI AEROSPHERE) 160-9-4.8 mcg/actuation HFAA; Inhale 1 puff into the lungs 2 (two) times daily.  Dispense: 5.9 g; Refill: 0    4. Hyperlipidemia LDL goal <100  Assessment & Plan:  Lipitor 80 mg qhs The current medical regimen is effective;  continue present plan and medications       5. Non compliance w medication regimen  Assessment & Plan:  Patient reports inconsistent adherence to prescribed medications. She occasionally skips metoprolol doses, typically missing one day before resuming the following day. She experiences racing heart and palpitations when metoprolol is missed, particularly at night. She has not been using inhalers as prescribed      6. Current nicotine  use  Assessment & Plan:  It is very important that she quit smoking. There are various alternatives available to help with this difficult task, but first and foremost, she must make a firm commitment and decision to quit. The nature of nicotine addiction is discussed. The usefulness of behavioral therapy is discussed and suggested. The correct use, cost and side effects of nicotine replacement therapy such as gum or patches is discussed. Bupropion and its cost (sometimes not covered fully by insurance) and side effects are reviewed.       7. Obstructive sleep apnea  Assessment & Plan:  Reports wearing CPAP regularly, does feel using CPAP helps feel more rested.       Other orders  -     Discontinue: budesonide-glycopyr-formoterol (BREZTRI AEROSPHERE) 160-9-4.8 mcg/actuation HFAA; Inhale 1 puff into the lungs 2 (two) times daily.  Dispense: 5.9 g; Refill: 0         Assessment & Plan    J44.89 Exacerbation of chronic bronchiolitis  J41.0 Simple chronic bronchitis  J20.9, J42 Acute exacerbation of chronic bronchitis  F41.9, F32.A Anxiety and depression  I10 Primary hypertension  E78.5 Hyperlipidemia LDL goal <100    IMPRESSION:  - Assessed patient's smoking status and anxiety symptoms  - Evaluated effectiveness of current anxiety medications  - Considered increasing Buspirone dosage to address ongoing anxiety  - Reviewed patient's adherence to asthma medications and need for resuming bronchodilator use  - Assessed blood pressure control and adherence to metoprolol  - Noted improvement in bronchitis symptoms post-antibiotic treatment  - Considered restarting Chantix for smoking cessation    J20.9, J42 ACUTE EXACERBATION OF CHRONIC BRONCHITIS:  - Restarted bronchodilator inhaler, to be used twice daily.  - Restarted albuterol inhaler, to be used twice daily.  - Continued montelukast for asthma management.  - Continued cetirizine for allergy management.    F41.9, F32.A ANXIETY AND DEPRESSION:  - Explained potential side  "effects of increased buspirone dosage, describing it as a "medicine head" or "buzzy feeling".  - Educated the patient on the benefits of social support for managing anxiety.  - Advised the patient to engage in stress-reducing activities such as prayer or support groups.  - Increased buspirone to 15 mg twice daily for anxiety management.  - Continued citalopram at current dose.  - Continued hydroxyzine as needed for anxiety.    I10 PRIMARY HYPERTENSION:  - Instructed the patient to check blood pressure and pulse daily for the next 2 weeks and record results.  - Continued metoprolol at current dose for blood pressure management.  - Advised the patient to contact the office after 2 weeks of blood pressure and pulse monitoring to report results.  - Instructed the patient to follow up with Bambi (office staff) regarding blood pressure readings if not within target range of less than 130/80.    E78.5 HYPERLIPIDEMIA LDL GOAL <100:  - Discussed the importance of consistent medication use for managing cholesterol.          Future Appointments   Date Time Provider Department Center   4/2/2025  9:20 AM Criselda Garcia DNP [facility]   7/7/2025  8:35 AM NURSE, [facility] FAMILY MEDICINE [facility]       Follow up in about 6 weeks (around 4/2/2025). Call sooner if needed.      This note was generated with the assistance of ambient listening technology. Verbal consent was obtained by the patient and accompanying visitor(s) for the recording of patient appointment to facilitate this note. I attest to having reviewed and edited the generated note for accuracy, though some syntax or spelling errors may persist. Please contact the author of this note for any clarification.      Criselda Garcia DNP           "

## 2025-02-19 NOTE — ASSESSMENT & PLAN NOTE
Improved since antibiotic but scattered rhonchi and wheeze, continue singulair, zyrtec, resume breztri and albuterol bid. Work to wean smoking 1/2 pack, chantix.

## 2025-02-19 NOTE — ASSESSMENT & PLAN NOTE
Toprol xl 25 mg daily. Skips dose rarely. Feeling more nocturnal palpitations, but skipped yesterday. Check blood pressure and pulse bid for next 2 weeks and call with results. If not <130/80 may adjust medication.

## 2025-02-19 NOTE — ASSESSMENT & PLAN NOTE
Patient reports inconsistent adherence to prescribed medications. She occasionally skips metoprolol doses, typically missing one day before resuming the following day. She experiences racing heart and palpitations when metoprolol is missed, particularly at night. She has not been using inhalers as prescribed

## 2025-03-05 ENCOUNTER — TELEPHONE (OUTPATIENT)
Dept: FAMILY MEDICINE | Facility: CLINIC | Age: 55
End: 2025-03-05
Payer: COMMERCIAL

## 2025-03-05 NOTE — TELEPHONE ENCOUNTER
----- Message from Bambi sent at 2/19/2025  9:55 AM CST -----  Call Patient to check home blood pressure and pulse

## 2025-04-29 ENCOUNTER — OFFICE VISIT (OUTPATIENT)
Dept: FAMILY MEDICINE | Facility: CLINIC | Age: 55
End: 2025-04-29
Payer: COMMERCIAL

## 2025-04-29 VITALS
WEIGHT: 184 LBS | DIASTOLIC BLOOD PRESSURE: 74 MMHG | HEIGHT: 64 IN | RESPIRATION RATE: 18 BRPM | TEMPERATURE: 98 F | HEART RATE: 60 BPM | BODY MASS INDEX: 31.41 KG/M2 | OXYGEN SATURATION: 96 % | SYSTOLIC BLOOD PRESSURE: 130 MMHG

## 2025-04-29 DIAGNOSIS — J20.9 ACUTE EXACERBATION OF CHRONIC BRONCHITIS: Primary | ICD-10-CM

## 2025-04-29 DIAGNOSIS — F32.A ANXIETY AND DEPRESSION: ICD-10-CM

## 2025-04-29 DIAGNOSIS — F41.9 ANXIETY AND DEPRESSION: ICD-10-CM

## 2025-04-29 DIAGNOSIS — Z72.0 CURRENT NICOTINE USE: ICD-10-CM

## 2025-04-29 DIAGNOSIS — J42 ACUTE EXACERBATION OF CHRONIC BRONCHITIS: Primary | ICD-10-CM

## 2025-04-29 DIAGNOSIS — J30.2 SEASONAL ALLERGIC RHINITIS, UNSPECIFIED TRIGGER: ICD-10-CM

## 2025-04-29 PROCEDURE — 99214 OFFICE O/P EST MOD 30 MIN: CPT | Mod: ,,, | Performed by: NURSE PRACTITIONER

## 2025-04-29 PROCEDURE — 3075F SYST BP GE 130 - 139MM HG: CPT | Mod: CPTII,,, | Performed by: NURSE PRACTITIONER

## 2025-04-29 PROCEDURE — 1159F MED LIST DOCD IN RCRD: CPT | Mod: CPTII,,, | Performed by: NURSE PRACTITIONER

## 2025-04-29 PROCEDURE — 3044F HG A1C LEVEL LT 7.0%: CPT | Mod: CPTII,,, | Performed by: NURSE PRACTITIONER

## 2025-04-29 PROCEDURE — 3078F DIAST BP <80 MM HG: CPT | Mod: CPTII,,, | Performed by: NURSE PRACTITIONER

## 2025-04-29 PROCEDURE — 1160F RVW MEDS BY RX/DR IN RCRD: CPT | Mod: CPTII,,, | Performed by: NURSE PRACTITIONER

## 2025-04-29 PROCEDURE — 3008F BODY MASS INDEX DOCD: CPT | Mod: CPTII,,, | Performed by: NURSE PRACTITIONER

## 2025-04-29 RX ORDER — AZELASTINE 1 MG/ML
1 SPRAY, METERED NASAL 2 TIMES DAILY
Qty: 30 ML | Refills: 0 | Status: SHIPPED | OUTPATIENT
Start: 2025-04-29 | End: 2026-04-29

## 2025-04-29 RX ORDER — BUSPIRONE HYDROCHLORIDE 15 MG/1
15 TABLET ORAL 3 TIMES DAILY
Qty: 90 TABLET | Refills: 1 | Status: SHIPPED | OUTPATIENT
Start: 2025-04-29

## 2025-04-29 RX ORDER — HYDROXYZINE HYDROCHLORIDE 25 MG/1
25 TABLET, FILM COATED ORAL NIGHTLY PRN
Qty: 90 TABLET | Refills: 1 | Status: SHIPPED | OUTPATIENT
Start: 2025-04-29

## 2025-04-29 RX ORDER — CEFDINIR 300 MG/1
300 CAPSULE ORAL 2 TIMES DAILY
Qty: 20 CAPSULE | Refills: 0 | Status: SHIPPED | OUTPATIENT
Start: 2025-04-29 | End: 2025-05-09

## 2025-04-29 RX ORDER — MONTELUKAST SODIUM 10 MG/1
10 TABLET ORAL NIGHTLY
Qty: 90 TABLET | Refills: 1 | Status: SHIPPED | OUTPATIENT
Start: 2025-04-29

## 2025-04-29 RX ORDER — PREDNISONE 20 MG/1
10 TABLET ORAL DAILY
Qty: 14 TABLET | Refills: 0 | Status: SHIPPED | OUTPATIENT
Start: 2025-04-29

## 2025-04-29 RX ORDER — BUDESONIDE, GLYCOPYRROLATE, AND FORMOTEROL FUMARATE 160; 9; 4.8 UG/1; UG/1; UG/1
1 AEROSOL, METERED RESPIRATORY (INHALATION) 2 TIMES DAILY
Qty: 5.9 G | Refills: 0 | Status: SHIPPED | OUTPATIENT
Start: 2025-04-29

## 2025-04-29 RX ORDER — CITALOPRAM 40 MG/1
40 TABLET, FILM COATED ORAL DAILY
Qty: 90 TABLET | Refills: 1 | Status: SHIPPED | OUTPATIENT
Start: 2025-04-29

## 2025-04-29 NOTE — ASSESSMENT & PLAN NOTE
Omnicef 300 mg every 12 for 10 days. Brestri 2 puffs bid, dexamethasone. Albuterol HFA 2 puffs q8h prn wheeze. Quit smoking. Mucinex in am and phenergan with codeine.

## 2025-04-29 NOTE — ASSESSMENT & PLAN NOTE
Has two bottles of chantix, would be willing to quit smoking, currently 10 cigarettes day, restart chantix, decrease to 5 cigarettes daily. Nicotine patch. Return in 4 weeks for follow up.

## 2025-04-29 NOTE — PROGRESS NOTES
Patient ID: Mary Salguero  : 1970     Chief Complaint: Cough, Nasal Congestion, and post nasal drip    Allergies: Patient is allergic to sudafed sinus and cold.     History of Present Illness:  The patient is a 55 y.o. White female who presents to clinic for evaluation and management with a chief complaint of Cough, Nasal Congestion, and post nasal drip   HPI   Patient presents with post nasal drip and cough.  Coughing up green phlegm. Symptoms started Friday. Denies fever. Denies sore throat. Denies body aches. Having shortness of breath at times. Nasal congestion with clear mucous and sinus pressure.  Down to 10 a day but not excited willing to try to quit smoking at this time.  Increased allergies and has been off medicines for allergies no fever yet but the congestion and wheezing is increasing daily and now with productive purulent sputum.      Past Medical History:  has a past medical history of Allergy and Hypertension.    Social History:  reports that she has been smoking cigarettes. She has been exposed to tobacco smoke. She has never used smokeless tobacco. She reports that she does not drink alcohol and does not use drugs.    Care Team: Patient Care Team:  Criselda Garcia DNP as PCP - General (Family Medicine)     Current Medications:  Current Outpatient Medications   Medication Instructions    albuterol (VENTOLIN HFA) 90 mcg/actuation inhaler 2 puffs, Inhalation, Every 6 hours PRN, Rescue    atorvastatin (LIPITOR) 80 mg, Oral, Nightly    azelastine (ASTELIN) 137 mcg, Nasal, 2 times daily    budesonide-glycopyr-formoterol (BREZTRI AEROSPHERE) 160-9-4.8 mcg/actuation HFAA 1 puff, Inhalation, 2 times daily    busPIRone (BUSPAR) 15 mg, Oral, 3 times daily, Dose increase for anxiety    cefdinir (OMNICEF) 300 mg, Oral, 2 times daily    citalopram (CELEXA) 40 mg, Oral, Daily    hydrOXYzine HCL (ATARAX) 25 mg, Oral, Nightly PRN    metoprolol succinate (TOPROL-XL) 25 mg, Oral, Daily    montelukast  "(SINGULAIR) 10 mg, Oral, Nightly    predniSONE (DELTASONE) 10 mg, Oral, Daily    varenicline tartrate (CHANTIX) 1 mg, Oral, 2 times daily       Review of Systems   Constitutional:  Negative for activity change, appetite change and fever.   HENT:  Positive for nasal congestion and postnasal drip.    Respiratory:  Positive for cough and wheezing. Negative for chest tightness and shortness of breath.    Cardiovascular:  Negative for palpitations and claudication.   Gastrointestinal: Negative.    Genitourinary: Negative.    Neurological: Negative.    Psychiatric/Behavioral:  The patient is nervous/anxious.    All other systems reviewed and are negative.       Visit Vitals  /74 (BP Location: Left arm)   Pulse 60   Temp 97.9 °F (36.6 °C) (Temporal)   Resp 18   Ht 5' 4" (1.626 m)   Wt 83.5 kg (184 lb)   SpO2 96%   BMI 31.58 kg/m²       Physical Exam  Constitutional:       Appearance: Normal appearance.   HENT:      Head: Normocephalic.      Right Ear: Tympanic membrane normal.      Left Ear: Tympanic membrane normal.      Nose: Congestion and rhinorrhea present.      Mouth/Throat:      Mouth: Mucous membranes are dry.      Pharynx: Posterior oropharyngeal erythema present.   Eyes:      Extraocular Movements: Extraocular movements intact.      Conjunctiva/sclera: Conjunctivae normal.   Cardiovascular:      Rate and Rhythm: Normal rate and regular rhythm.      Pulses: Normal pulses.      Heart sounds: Normal heart sounds.   Pulmonary:      Effort: Pulmonary effort is normal.      Breath sounds: Wheezing and rhonchi present.   Abdominal:      General: Bowel sounds are normal.      Palpations: Abdomen is soft.   Musculoskeletal:         General: Normal range of motion.      Cervical back: Normal range of motion.   Skin:     General: Skin is warm and dry.      Capillary Refill: Capillary refill takes less than 2 seconds.   Neurological:      General: No focal deficit present.      Mental Status: She is alert and oriented " to person, place, and time.   Psychiatric:         Mood and Affect: Mood normal.          Labs Reviewed:  Chemistry:  Lab Results   Component Value Date     01/07/2025    K 4.0 01/07/2025    BUN 10 01/07/2025    CREATININE 1.01 (H) 01/07/2025    EGFRNORACEVR 65 01/07/2025    CALCIUM 9.4 01/07/2025    ALKPHOS 106 01/03/2022    ALBUMIN 4.1 01/07/2025    AST 14 01/07/2025    ALT 15 01/07/2025    HGNYCHEF17QH 30.3 02/12/2024    TSH 1.060 02/12/2024        Lab Results   Component Value Date    HGBA1C 5.5 01/07/2025        Hematology:  Lab Results   Component Value Date    WBC 7.3 08/19/2024    RBC 5.45 (H) 08/19/2024    HGB 15.9 08/19/2024    HCT 47.9 (H) 08/19/2024    MCV 88 08/19/2024    MCH 29.2 08/19/2024    MCHC 33.2 08/19/2024    RDW 12.5 08/19/2024     08/19/2024    MONO 7 08/19/2024    MONO 0.5 08/19/2024    BASO 0.1 08/19/2024    EOSINOPHIL 2 08/19/2024    BASOPHIL 2 08/19/2024       Lipid Panel:  Lab Results   Component Value Date    CHOL 235 (H) 01/07/2025    HDL 33 (L) 01/07/2025    LDLCALC 164 (H) 01/07/2025    TRIG 205 (H) 01/07/2025    TOTALCHOLEST 3.8 01/03/2022        Assessment & Plan:  1. Acute exacerbation of chronic bronchitis  Assessment & Plan:  Omnicef 300 mg every 12 for 10 days. Brestri 2 puffs bid, dexamethasone. Albuterol HFA 2 puffs q8h prn wheeze. Quit smoking. Mucinex in am and phenergan with codeine.     Orders:  -     predniSONE (DELTASONE) 20 MG tablet; Take 0.5 tablets (10 mg total) by mouth once daily.  Dispense: 14 tablet; Refill: 0  -     cefdinir (OMNICEF) 300 MG capsule; Take 1 capsule (300 mg total) by mouth 2 (two) times daily. for 10 days  Dispense: 20 capsule; Refill: 0    2. Anxiety and depression  -     busPIRone (BUSPAR) 15 MG tablet; Take 1 tablet (15 mg total) by mouth 3 (three) times daily. Dose increase for anxiety  Dispense: 90 tablet; Refill: 1  -     citalopram (CELEXA) 40 MG tablet; Take 1 tablet (40 mg total) by mouth once daily.  Dispense: 90 tablet;  Refill: 1  -     hydrOXYzine HCL (ATARAX) 25 MG tablet; Take 1 tablet (25 mg total) by mouth nightly as needed for Anxiety.  Dispense: 90 tablet; Refill: 1    3. Current nicotine use  Assessment & Plan:  Has two bottles of chantix, would be willing to quit smoking, currently 10 cigarettes day, restart chantix, decrease to 5 cigarettes daily. Nicotine patch. Return in 4 weeks for follow up.       4. Seasonal allergic rhinitis, unspecified trigger  -     azelastine (ASTELIN) 137 mcg (0.1 %) nasal spray; 1 spray (137 mcg total) by Nasal route 2 (two) times daily.  Dispense: 30 mL; Refill: 0  -     hydrOXYzine HCL (ATARAX) 25 MG tablet; Take 1 tablet (25 mg total) by mouth nightly as needed for Anxiety.  Dispense: 90 tablet; Refill: 1  -     montelukast (SINGULAIR) 10 mg tablet; Take 1 tablet (10 mg total) by mouth every evening.  Dispense: 90 tablet; Refill: 1  -     cefdinir (OMNICEF) 300 MG capsule; Take 1 capsule (300 mg total) by mouth 2 (two) times daily. for 10 days  Dispense: 20 capsule; Refill: 0    Other orders  -     budesonide-glycopyr-formoterol (BREZTRI AEROSPHERE) 160-9-4.8 mcg/actuation HFAA; Inhale 1 puff into the lungs 2 (two) times daily.  Dispense: 5.9 g; Refill: 0         Future Appointments   Date Time Provider Department Center   7/7/2025  8:35 AM NURSE, [facility] FAMILY MEDICINE [facility]       Follow up in about 4 weeks (around 5/27/2025). Call sooner if needed.    Criselda Garcia, DNP

## 2025-05-26 ENCOUNTER — TELEPHONE (OUTPATIENT)
Dept: FAMILY MEDICINE | Facility: CLINIC | Age: 55
End: 2025-05-26

## 2025-05-26 ENCOUNTER — RESULTS FOLLOW-UP (OUTPATIENT)
Dept: FAMILY MEDICINE | Facility: CLINIC | Age: 55
End: 2025-05-26

## 2025-05-26 ENCOUNTER — OFFICE VISIT (OUTPATIENT)
Dept: FAMILY MEDICINE | Facility: CLINIC | Age: 55
End: 2025-05-26
Payer: COMMERCIAL

## 2025-05-26 ENCOUNTER — HOSPITAL ENCOUNTER (OUTPATIENT)
Dept: RADIOLOGY | Facility: HOSPITAL | Age: 55
Discharge: HOME | End: 2025-05-26
Attending: NURSE PRACTITIONER
Payer: COMMERCIAL

## 2025-05-26 VITALS
DIASTOLIC BLOOD PRESSURE: 74 MMHG | HEART RATE: 74 BPM | WEIGHT: 185 LBS | RESPIRATION RATE: 18 BRPM | BODY MASS INDEX: 31.58 KG/M2 | OXYGEN SATURATION: 96 % | SYSTOLIC BLOOD PRESSURE: 134 MMHG | HEIGHT: 64 IN | TEMPERATURE: 97 F

## 2025-05-26 DIAGNOSIS — F32.A ANXIETY AND DEPRESSION: ICD-10-CM

## 2025-05-26 DIAGNOSIS — J20.9 ACUTE EXACERBATION OF CHRONIC BRONCHITIS: Primary | ICD-10-CM

## 2025-05-26 DIAGNOSIS — J41.8 MIXED SIMPLE AND MUCOPURULENT CHRONIC BRONCHITIS: Primary | ICD-10-CM

## 2025-05-26 DIAGNOSIS — F41.9 ANXIETY AND DEPRESSION: ICD-10-CM

## 2025-05-26 DIAGNOSIS — J20.9 ACUTE EXACERBATION OF CHRONIC BRONCHITIS: ICD-10-CM

## 2025-05-26 DIAGNOSIS — J42 ACUTE EXACERBATION OF CHRONIC BRONCHITIS: Primary | ICD-10-CM

## 2025-05-26 DIAGNOSIS — Z12.2 SCREENING FOR LUNG CANCER: ICD-10-CM

## 2025-05-26 DIAGNOSIS — J42 ACUTE EXACERBATION OF CHRONIC BRONCHITIS: ICD-10-CM

## 2025-05-26 DIAGNOSIS — J41.8 MIXED SIMPLE AND MUCOPURULENT CHRONIC BRONCHITIS: ICD-10-CM

## 2025-05-26 PROCEDURE — 3044F HG A1C LEVEL LT 7.0%: CPT | Mod: CPTII,,, | Performed by: NURSE PRACTITIONER

## 2025-05-26 PROCEDURE — 99214 OFFICE O/P EST MOD 30 MIN: CPT | Mod: ,,, | Performed by: NURSE PRACTITIONER

## 2025-05-26 PROCEDURE — 3008F BODY MASS INDEX DOCD: CPT | Mod: CPTII,,, | Performed by: NURSE PRACTITIONER

## 2025-05-26 PROCEDURE — 1160F RVW MEDS BY RX/DR IN RCRD: CPT | Mod: CPTII,,, | Performed by: NURSE PRACTITIONER

## 2025-05-26 PROCEDURE — 1159F MED LIST DOCD IN RCRD: CPT | Mod: CPTII,,, | Performed by: NURSE PRACTITIONER

## 2025-05-26 PROCEDURE — 3078F DIAST BP <80 MM HG: CPT | Mod: CPTII,,, | Performed by: NURSE PRACTITIONER

## 2025-05-26 PROCEDURE — 3075F SYST BP GE 130 - 139MM HG: CPT | Mod: CPTII,,, | Performed by: NURSE PRACTITIONER

## 2025-05-26 PROCEDURE — 71046 X-RAY EXAM CHEST 2 VIEWS: CPT | Mod: TC

## 2025-05-26 RX ORDER — BUDESONIDE, GLYCOPYRROLATE, AND FORMOTEROL FUMARATE 160; 9; 4.8 UG/1; UG/1; UG/1
2 AEROSOL, METERED RESPIRATORY (INHALATION) 2 TIMES DAILY
Qty: 5.9 G | Refills: 0 | Status: SHIPPED | OUTPATIENT
Start: 2025-05-26

## 2025-05-26 RX ORDER — BUSPIRONE HYDROCHLORIDE 15 MG/1
15 TABLET ORAL 3 TIMES DAILY
Qty: 90 TABLET | Refills: 1 | Status: SHIPPED | OUTPATIENT
Start: 2025-05-26

## 2025-05-26 NOTE — ASSESSMENT & PLAN NOTE
Completed omnicef until completed but continues with coughing severe especially at night. Check congestion continues despite using albuterol 2 puffs every 8-12 hours. Some days still with severe cough and wheezing. No fever. Smoking down to 10-20 cigarettes daily. Not ready to quit. Needs PFT and chest xray. Consider ct lung for screening with smoking 40+ years. Continue brestri 2 puffs bid. Refer to pulmonologist for evaluation. Jasvir

## 2025-05-26 NOTE — TELEPHONE ENCOUNTER
----- Message from Kera sent at 5/26/2025  3:39 PM CDT -----  Regarding: PFT Order  Good Afternoon, We are unable to schedule this test (Pulmonary Functions Test) can the order please be corrected to PFT 41 Complete PFT W/ Bronchodilator. If you can please update at your earliest convenience Thank you,Kera

## 2025-05-26 NOTE — PROGRESS NOTES
Patient ID: Mary Salguero  : 1970     Chief Complaint: bronchitis(f/u)    Allergies: Patient is allergic to sudafed sinus and cold.     History of Present Illness:  The patient is a 55 y.o. White female who presents to clinic for evaluation and management with a chief complaint of  bronchitis(f/u)    HPI   Patient presents for 1 month follow up on bronchitis. Still having productive cough. Denies fever. Still with mild shortness of breath. Coughing very severely especially in evening.     Past Medical History:  has a past medical history of Allergy and Hypertension.    Social History:  reports that she has been smoking cigarettes. She has been exposed to tobacco smoke. She has never used smokeless tobacco. She reports that she does not drink alcohol and does not use drugs.    Care Team: Patient Care Team:  Criselda Garcia DNP as PCP - General (Family Medicine)     Current Medications:  Current Outpatient Medications   Medication Instructions    albuterol (VENTOLIN HFA) 90 mcg/actuation inhaler 2 puffs, Inhalation, Every 6 hours PRN, Rescue    atorvastatin (LIPITOR) 80 mg, Oral, Nightly    azelastine (ASTELIN) 137 mcg, Nasal, 2 times daily    budesonide-glycopyr-formoterol (BREZTRI AEROSPHERE) 160-9-4.8 mcg/actuation HFAA 2 puffs, Inhalation, 2 times daily    busPIRone (BUSPAR) 15 mg, Oral, 3 times daily, Dose increase for anxiety    citalopram (CELEXA) 40 mg, Oral, Daily    codeine-guaiFENesin 7.5-225 mg/5 mL Liqd 7.5 mLs, Oral, 2 times daily PRN    hydrOXYzine HCL (ATARAX) 25 mg, Oral, Nightly PRN    metoprolol succinate (TOPROL-XL) 25 mg, Oral, Daily    montelukast (SINGULAIR) 10 mg, Oral, Nightly       Review of Systems   HENT:  Negative for postnasal drip.    Respiratory:  Positive for cough, shortness of breath and wheezing. Negative for choking and chest tightness.    Cardiovascular: Negative.    Psychiatric/Behavioral:  Positive for sleep disturbance. Negative for dysphoric mood. The patient is not  "nervous/anxious.    All other systems reviewed and are negative.       Visit Vitals  /74 (BP Location: Left arm, Patient Position: Sitting)   Pulse 74   Temp 97.1 °F (36.2 °C) (Temporal)   Resp 18   Ht 5' 4" (1.626 m)   Wt 83.9 kg (185 lb)   SpO2 96%   BMI 31.76 kg/m²       Physical Exam  Vitals and nursing note reviewed.   Constitutional:       General: She is not in acute distress.     Appearance: She is not ill-appearing.   HENT:      Head: Normocephalic and atraumatic.      Right Ear: Tympanic membrane normal.      Left Ear: Tympanic membrane normal.      Nose: Nose normal.      Mouth/Throat:      Mouth: Mucous membranes are moist.      Pharynx: Oropharynx is clear.   Eyes:      General: No scleral icterus.     Extraocular Movements: Extraocular movements intact.      Conjunctiva/sclera: Conjunctivae normal.      Pupils: Pupils are equal, round, and reactive to light.   Neck:      Vascular: No carotid bruit.   Cardiovascular:      Rate and Rhythm: Normal rate and regular rhythm.      Pulses: Normal pulses.      Heart sounds: Normal heart sounds. No murmur heard.     No friction rub. No gallop.   Pulmonary:      Effort: Pulmonary effort is normal. No respiratory distress.      Breath sounds: Normal breath sounds. No wheezing, rhonchi or rales.   Abdominal:      General: Abdomen is flat. Bowel sounds are normal. There is no distension.      Palpations: Abdomen is soft. There is no mass.      Tenderness: There is no abdominal tenderness.   Musculoskeletal:         General: Normal range of motion.      Cervical back: Normal range of motion and neck supple.   Skin:     General: Skin is warm and dry.   Neurological:      General: No focal deficit present.      Mental Status: She is alert.   Psychiatric:         Mood and Affect: Mood normal.          Labs Reviewed:  Chemistry:  Lab Results   Component Value Date     01/07/2025    K 4.0 01/07/2025    BUN 10 01/07/2025    CREATININE 1.01 (H) 01/07/2025    " EGFRNORACEVR 65 01/07/2025    CALCIUM 9.4 01/07/2025    ALKPHOS 106 01/03/2022    ALBUMIN 4.1 01/07/2025    AST 14 01/07/2025    ALT 15 01/07/2025    [id number] 30.3 02/12/2024    TSH 1.060 02/12/2024        Lab Results   Component Value Date    HGBA1C 5.5 01/07/2025        Hematology:  Lab Results   Component Value Date    WBC 7.3 08/19/2024    RBC 5.45 (H) 08/19/2024    HGB 15.9 08/19/2024    HCT 47.9 (H) 08/19/2024    MCV 88 08/19/2024    MCH 29.2 08/19/2024    MCHC 33.2 08/19/2024    RDW 12.5 08/19/2024     08/19/2024    MONO 7 08/19/2024    MONO 0.5 08/19/2024    BASO 0.1 08/19/2024    EOSINOPHIL 2 08/19/2024    BASOPHIL 2 08/19/2024       Lipid Panel:  Lab Results   Component Value Date    CHOL 235 (H) 01/07/2025    HDL 33 (L) 01/07/2025    LDLCALC 164 (H) 01/07/2025    TRIG 205 (H) 01/07/2025    TOTALCHOLEST 3.8 01/03/2022        Assessment & Plan:  1. Mixed simple and mucopurulent chronic bronchitis  Assessment & Plan:  Completed omnicef until completed but continues with coughing severe especially at night. Check congestion continues despite using albuterol 2 puffs every 8-12 hours. Some days still with severe cough and wheezing. No fever. Smoking down to 10-20 cigarettes daily. Not ready to quit. Needs PFT and chest xray. Consider ct lung for screening with smoking 40+ years. Continue brestri 2 puffs bid. Refer to pulmonologist for evaluation. Marcof      Orders:  -     budesonide-glycopyr-formoterol (BREZTRI AEROSPHERE) 160-9-4.8 mcg/actuation HFAA; Inhale 2 puffs into the lungs 2 (two) times daily.  Dispense: 5.9 g; Refill: 0  -     codeine-guaiFENesin 7.5-225 mg/5 mL Liqd; Take 7.5 mLs by mouth 2 (two) times daily as needed (cough).  Dispense: 240 mL; Refill: 0  -     X-Ray Chest PA And Lateral; Future; Expected date: 05/26/2025    2. Anxiety and depression  -     busPIRone (BUSPAR) 15 MG tablet; Take 1 tablet (15 mg total) by mouth 3 (three) times daily. Dose increase for anxiety  Dispense: 90  tablet; Refill: 1    3. Screening for lung cancer  Assessment & Plan:  Signs of lung cancer has been smoking in excess of 40+ years has weaned to 1/2 to 1 pack but not willing to quit at this time negative for signs of lung cancer but agreed to the CT of chest for screening if abnormal or pulmonologist recommendation.       4. Acute exacerbation of chronic bronchitis         Future Appointments   Date Time Provider Department Center   7/7/2025  8:35 AM NURSE, [facility] FAMILY MEDICINE [facility] JENNIFER Myers       Follow up in about 6 weeks (around 7/7/2025). Call sooner if needed.    Criselda Garcia, DNP

## 2025-05-26 NOTE — ASSESSMENT & PLAN NOTE
Signs of lung cancer has been smoking in excess of 40+ years has weaned to 1/2 to 1 pack but not willing to quit at this time negative for signs of lung cancer but agreed to the CT of chest for screening if abnormal or pulmonologist recommendation.

## 2025-05-28 ENCOUNTER — TELEPHONE (OUTPATIENT)
Dept: FAMILY MEDICINE | Facility: CLINIC | Age: 55
End: 2025-05-28
Payer: COMMERCIAL

## 2025-05-28 NOTE — TELEPHONE ENCOUNTER
----- Message from Criselda Garcia DNP sent at 5/26/2025  5:16 PM CDT -----  Notify normal chest xray no abnormalities to get ct lung. Refer to pulmonologist and schedule PFT.   ----- Message -----  From: Tre, Rad Results In  Sent: 5/26/2025   1:16 PM CDT  To: Criselda Garcia DNP

## 2025-07-09 ENCOUNTER — OFFICE VISIT (OUTPATIENT)
Dept: FAMILY MEDICINE | Facility: CLINIC | Age: 55
End: 2025-07-09
Payer: COMMERCIAL

## 2025-07-09 VITALS
WEIGHT: 187 LBS | TEMPERATURE: 98 F | HEART RATE: 55 BPM | RESPIRATION RATE: 20 BRPM | BODY MASS INDEX: 31.92 KG/M2 | HEIGHT: 64 IN | OXYGEN SATURATION: 96 % | DIASTOLIC BLOOD PRESSURE: 72 MMHG | SYSTOLIC BLOOD PRESSURE: 126 MMHG

## 2025-07-09 DIAGNOSIS — J45.20 MILD INTERMITTENT ASTHMA WITHOUT COMPLICATION: ICD-10-CM

## 2025-07-09 DIAGNOSIS — J43.9 PULMONARY EMPHYSEMA, UNSPECIFIED EMPHYSEMA TYPE: Primary | ICD-10-CM

## 2025-07-09 DIAGNOSIS — J41.0 SIMPLE CHRONIC BRONCHITIS: ICD-10-CM

## 2025-07-09 DIAGNOSIS — E78.5 HYPERLIPIDEMIA, UNSPECIFIED HYPERLIPIDEMIA TYPE: ICD-10-CM

## 2025-07-09 DIAGNOSIS — F32.A ANXIETY AND DEPRESSION: ICD-10-CM

## 2025-07-09 DIAGNOSIS — F41.9 ANXIETY AND DEPRESSION: ICD-10-CM

## 2025-07-09 RX ORDER — ATORVASTATIN CALCIUM 80 MG/1
80 TABLET, FILM COATED ORAL NIGHTLY
Qty: 90 TABLET | Refills: 1 | Status: SHIPPED | OUTPATIENT
Start: 2025-07-09

## 2025-07-09 RX ORDER — ARFORMOTEROL TARTRATE 15 UG/2ML
15 SOLUTION RESPIRATORY (INHALATION) 2 TIMES DAILY
COMMUNITY
Start: 2025-06-25 | End: 2025-07-09 | Stop reason: ALTCHOICE

## 2025-07-09 RX ORDER — BUDESONIDE 0.5 MG/2ML
0.5 INHALANT ORAL 2 TIMES DAILY
COMMUNITY
Start: 2025-06-25 | End: 2026-06-20

## 2025-07-09 NOTE — PROGRESS NOTES
Patient ID: Mary Salguero  : 1970     Chief Complaint: No chief complaint on file.    Allergies: Patient is allergic to sudafed sinus and cold.     History of Present Illness:  The patient is a 55 y.o. White female who presents to clinic for evaluation and management with a chief complaint of follow up to review labs and check lungs.  HPI   Patient visit to review labs and follow up on bronchitis. Patient states breathing has improved. Recently saw pulmonologist, Dr López diagnosed emphysema and asthma. Returning early August, was smoking 2 packs daily now using nicotine lozenges. Has chantix at home and plans to stop by early as August.   Past Medical History:  has a past medical history of Allergy and Hypertension.    Social History:  reports that she has been smoking cigarettes. She has been exposed to tobacco smoke. She has never used smokeless tobacco. She reports that she does not drink alcohol and does not use drugs.    Care Team: Patient Care Team:  Criselda Garcia DNP as PCP - General (Family Medicine)     Current Medications:  Current Outpatient Medications   Medication Instructions    albuterol (VENTOLIN HFA) 90 mcg/actuation inhaler 2 puffs, Inhalation, Every 6 hours PRN, Rescue    atorvastatin (LIPITOR) 80 mg, Oral, Nightly    azelastine (ASTELIN) 137 mcg, Nasal, 2 times daily    budesonide (PULMICORT) 0.5 mg, 2 times daily    budesonide-glycopyr-formoterol (BREZTRI AEROSPHERE) 160-9-4.8 mcg/actuation HFAA 2 puffs, Inhalation, 2 times daily    busPIRone (BUSPAR) 15 mg, Oral, 3 times daily, Dose increase for anxiety    citalopram (CELEXA) 40 mg, Oral, Daily    hydrOXYzine HCL (ATARAX) 25 mg, Oral, Nightly PRN    metoprolol succinate (TOPROL-XL) 25 mg, Oral, Daily    revefenacin 175 mcg, Daily       Review of Systems   HENT:  Positive for postnasal drip.    Respiratory:  Positive for cough and wheezing. Negative for chest tightness and shortness of breath.    Cardiovascular: Negative.   "  Gastrointestinal:  Positive for nausea.   Psychiatric/Behavioral:  Negative for depressed mood and sleep disturbance. The patient is not nervous/anxious.    All other systems reviewed and are negative.       Visit Vitals  /72 (BP Location: Left arm, Patient Position: Sitting)   Pulse (!) 55   Temp 97.6 °F (36.4 °C) (Temporal)   Resp 20   Ht 5' 4" (1.626 m)   Wt 84.8 kg (187 lb)   SpO2 96%   BMI 32.10 kg/m²       Physical Exam  Vitals and nursing note reviewed.   Constitutional:       Appearance: Normal appearance.   HENT:      Head: Normocephalic.      Nose: Nose normal.      Mouth/Throat:      Mouth: Mucous membranes are dry.   Eyes:      Extraocular Movements: Extraocular movements intact.      Conjunctiva/sclera: Conjunctivae normal.   Cardiovascular:      Rate and Rhythm: Normal rate and regular rhythm.      Pulses: Normal pulses.   Pulmonary:      Effort: Pulmonary effort is normal.      Breath sounds: Normal breath sounds.   Abdominal:      General: Bowel sounds are normal.      Palpations: Abdomen is soft.   Musculoskeletal:         General: Normal range of motion.      Cervical back: Normal range of motion.   Skin:     General: Skin is warm and dry.      Capillary Refill: Capillary refill takes less than 2 seconds.   Neurological:      General: No focal deficit present.      Mental Status: She is alert.   Psychiatric:         Mood and Affect: Mood normal.          Labs Reviewed:  Chemistry:  Lab Results   Component Value Date     07/07/2025    K 4.6 07/07/2025    BUN 12 07/07/2025    CREATININE 1.12 (H) 07/07/2025    EGFRNORACEVR 57 (L) 07/07/2025    CALCIUM 8.9 07/07/2025    ALKPHOS 106 01/03/2022    ALBUMIN 4.2 07/07/2025    AST 23 07/07/2025    ALT 27 07/07/2025    [id number] 32.9 07/07/2025    TSH 2.330 07/07/2025        Lab Results   Component Value Date    HGBA1C 5.8 (H) 07/07/2025        Hematology:  Lab Results   Component Value Date    WBC 7.5 07/07/2025    RBC 5.53 (H) 07/07/2025    " HGB 15.9 07/07/2025    HCT 48.8 (H) 07/07/2025    MCV 88 07/07/2025    MCH 28.8 07/07/2025    MCHC 32.6 07/07/2025    RDW 13.1 07/07/2025     07/07/2025    MONO 9 07/07/2025    MONO 0.6 07/07/2025    BASO 0.1 07/07/2025    EOSINOPHIL 3 07/07/2025    BASOPHIL 1 07/07/2025       Lipid Panel:  Lab Results   Component Value Date    CHOL 124 07/07/2025    HDL 44 07/07/2025    LDLCALC 63 07/07/2025    TRIG 89 07/07/2025    TOTALCHOLEST 3.8 01/03/2022        Assessment & Plan:  1. Pulmonary emphysema, unspecified emphysema type  Assessment & Plan:  -reinforced smoking cessation  -change inhaler to Brovana/ Yulperi/Budesonide bid  -atrovent nasal  -consider inhouse sleep study in future good heart rate but he can  -hrct today to eval for bronchiectasis d/t chronic cough, Dr López waiting to start Yupari (revefenacin) 175/3 ml daily. Budesinide bid waiting for arrival, may continue breztri until new medication arrives.       2. Hyperlipidemia, unspecified hyperlipidemia type    3. Anxiety and depression  Assessment & Plan:  Celexa 40 mg daily. BuSpar 15 mg b.i.d. for anxiety and hydroxyzine 25 mg qhs for rest/anxiety. Working better with higher dose of buspar      4. Mild intermittent asthma without complication  Assessment & Plan:  Budesonide 0.5 bid and will start when revefenacin arrives instead of breztri. The current medical regimen is effective;  continue present plan and medications.             No future appointments.    Follow up in about 3 months (around 10/9/2025). Call sooner if needed.    Criselda Garcia DNP    Lab Frequency Next Occurrence   Ambulatory referral/consult to Pulmonology Once 06/02/2025

## 2025-07-09 NOTE — ASSESSMENT & PLAN NOTE
-reinforced smoking cessation  -change inhaler to Brovana/ Yulperi/Budesonide bid  -atrovent nasal  -consider inhouse sleep study in future good heart rate but he can  -hrct today to eval for bronchiectasis d/t chronic cough, Dr López waiting to start Yupari (revefenacin) 175/3 ml daily. Budesinide bid waiting for arrival, may continue breztri until new medication arrives.   
Budesonide 0.5 bid and will start when revefenacin arrives instead of breztri. The current medical regimen is effective;  continue present plan and medications.    
Celexa 40 mg daily. BuSpar 15 mg b.i.d. for anxiety and hydroxyzine 25 mg qhs for rest/anxiety. Working better with higher dose of buspar  
denies pain/discomfort (Rating = 0)